# Patient Record
Sex: MALE | Race: BLACK OR AFRICAN AMERICAN | Employment: UNEMPLOYED | URBAN - METROPOLITAN AREA
[De-identification: names, ages, dates, MRNs, and addresses within clinical notes are randomized per-mention and may not be internally consistent; named-entity substitution may affect disease eponyms.]

---

## 2018-01-01 ENCOUNTER — HOSPITAL ENCOUNTER (INPATIENT)
Age: 68
LOS: 1 days | DRG: 853 | End: 2018-01-26
Attending: EMERGENCY MEDICINE | Admitting: INTERNAL MEDICINE
Payer: COMMERCIAL

## 2018-01-01 ENCOUNTER — APPOINTMENT (OUTPATIENT)
Dept: CT IMAGING | Age: 68
DRG: 853 | End: 2018-01-01
Payer: COMMERCIAL

## 2018-01-01 ENCOUNTER — APPOINTMENT (OUTPATIENT)
Dept: CARDIAC CATH/INVASIVE PROCEDURES | Age: 68
DRG: 853 | End: 2018-01-01
Payer: COMMERCIAL

## 2018-01-01 ENCOUNTER — APPOINTMENT (OUTPATIENT)
Dept: GENERAL RADIOLOGY | Age: 68
DRG: 853 | End: 2018-01-01
Payer: COMMERCIAL

## 2018-01-01 ENCOUNTER — APPOINTMENT (OUTPATIENT)
Dept: ULTRASOUND IMAGING | Age: 68
DRG: 853 | End: 2018-01-01
Payer: COMMERCIAL

## 2018-01-01 VITALS
HEART RATE: 61 BPM | DIASTOLIC BLOOD PRESSURE: 24 MMHG | RESPIRATION RATE: 12 BRPM | BODY MASS INDEX: 21.49 KG/M2 | WEIGHT: 150.13 LBS | TEMPERATURE: 98.2 F | SYSTOLIC BLOOD PRESSURE: 36 MMHG | HEIGHT: 70 IN | OXYGEN SATURATION: 42 %

## 2018-01-01 DIAGNOSIS — J96.02 ACUTE RESPIRATORY FAILURE WITH HYPOXIA AND HYPERCAPNIA (HCC): ICD-10-CM

## 2018-01-01 DIAGNOSIS — I50.9 ACUTE ON CHRONIC CONGESTIVE HEART FAILURE, UNSPECIFIED CONGESTIVE HEART FAILURE TYPE: ICD-10-CM

## 2018-01-01 DIAGNOSIS — J96.01 ACUTE RESPIRATORY FAILURE WITH HYPOXIA AND HYPERCAPNIA (HCC): ICD-10-CM

## 2018-01-01 DIAGNOSIS — I47.1 PAROXYSMAL SVT (SUPRAVENTRICULAR TACHYCARDIA) (HCC): ICD-10-CM

## 2018-01-01 DIAGNOSIS — A41.9 SEPSIS, DUE TO UNSPECIFIED ORGANISM: Primary | ICD-10-CM

## 2018-01-01 LAB
-: ABNORMAL
-: ABNORMAL
ABSOLUTE EOS #: 0 K/UL (ref 0–0.4)
ABSOLUTE EOS #: 0 K/UL (ref 0–0.44)
ABSOLUTE EOS #: 0 K/UL (ref 0–0.44)
ABSOLUTE IMMATURE GRANULOCYTE: 0 K/UL (ref 0–0.3)
ABSOLUTE LYMPH #: 44.72 K/UL (ref 1.1–3.7)
ABSOLUTE LYMPH #: 56.61 K/UL (ref 1.1–3.7)
ABSOLUTE LYMPH #: 73.92 K/UL (ref 1–4.8)
ABSOLUTE MONO #: 0.76 K/UL (ref 0.1–0.8)
ABSOLUTE MONO #: 6.24 K/UL (ref 0.1–1.2)
ABSOLUTE MONO #: 8.66 K/UL (ref 0.1–1.2)
ABSOLUTE RETIC #: 0.01 M/UL (ref 0.03–0.08)
ACETAMINOPHEN LEVEL: <10 UG/ML (ref 10–30)
ACTION: NORMAL
ALBUMIN SERPL-MCNC: 2.4 G/DL (ref 3.5–5.2)
ALBUMIN SERPL-MCNC: 2.6 G/DL (ref 3.5–5.2)
ALBUMIN/GLOBULIN RATIO: 1 (ref 1–2.5)
ALBUMIN/GLOBULIN RATIO: 1.4 (ref 1–2.5)
ALLEN TEST: ABNORMAL
ALLEN TEST: POSITIVE
ALP BLD-CCNC: 50 U/L (ref 40–129)
ALP BLD-CCNC: 54 U/L (ref 40–129)
ALT SERPL-CCNC: 11 U/L (ref 5–41)
ALT SERPL-CCNC: 9 U/L (ref 5–41)
AMORPHOUS: ABNORMAL
AMORPHOUS: ABNORMAL
AMPHETAMINE SCREEN URINE: NEGATIVE
ANION GAP SERPL CALCULATED.3IONS-SCNC: 16 MMOL/L (ref 9–17)
ANION GAP SERPL CALCULATED.3IONS-SCNC: 16 MMOL/L (ref 9–17)
ANION GAP SERPL CALCULATED.3IONS-SCNC: 20 MMOL/L (ref 9–17)
ANION GAP: 20 MMOL/L (ref 7–16)
AST SERPL-CCNC: 28 U/L
AST SERPL-CCNC: 56 U/L
BACTERIA: ABNORMAL
BACTERIA: ABNORMAL
BARBITURATE SCREEN URINE: NEGATIVE
BASOPHILS # BLD: 0 % (ref 0–2)
BASOPHILS ABSOLUTE: 0 K/UL (ref 0–0.2)
BENZODIAZEPINE SCREEN, URINE: NEGATIVE
BILIRUB SERPL-MCNC: 1.1 MG/DL (ref 0.3–1.2)
BILIRUB SERPL-MCNC: 1.3 MG/DL (ref 0.3–1.2)
BILIRUBIN DIRECT: 0.73 MG/DL
BILIRUBIN DIRECT: 0.83 MG/DL
BILIRUBIN URINE: NEGATIVE
BILIRUBIN URINE: NEGATIVE
BILIRUBIN, INDIRECT: 0.37 MG/DL (ref 0–1)
BILIRUBIN, INDIRECT: 0.47 MG/DL (ref 0–1)
BLOOD BANK SPECIMEN: NORMAL
BNP INTERPRETATION: ABNORMAL
BUN BLDV-MCNC: 41 MG/DL (ref 8–23)
BUN/CREAT BLD: ABNORMAL (ref 9–20)
BUPRENORPHINE URINE: NORMAL
CALCIUM SERPL-MCNC: 6.8 MG/DL (ref 8.6–10.4)
CALCIUM SERPL-MCNC: 7 MG/DL (ref 8.6–10.4)
CALCIUM SERPL-MCNC: 7.1 MG/DL (ref 8.6–10.4)
CANNABINOID SCREEN URINE: NEGATIVE
CASTS UA: ABNORMAL /LPF (ref 0–2)
CASTS UA: ABNORMAL /LPF (ref 0–2)
CHLORIDE BLD-SCNC: 108 MMOL/L (ref 98–107)
CHLORIDE BLD-SCNC: 109 MMOL/L (ref 98–107)
CHLORIDE BLD-SCNC: 110 MMOL/L (ref 98–107)
CO2: 11 MMOL/L (ref 20–31)
CO2: 19 MMOL/L (ref 20–31)
CO2: 20 MMOL/L (ref 20–31)
COCAINE METABOLITE, URINE: NEGATIVE
COLOR: YELLOW
COLOR: YELLOW
CREAT SERPL-MCNC: 1.24 MG/DL (ref 0.7–1.2)
CREAT SERPL-MCNC: 1.33 MG/DL (ref 0.7–1.2)
CREAT SERPL-MCNC: 1.51 MG/DL (ref 0.7–1.2)
CREATININE URINE: 89.9 MG/DL (ref 39–259)
CREATININE URINE: 97.9 MG/DL (ref 39–259)
CRYSTALS, UA: ABNORMAL /HPF
CRYSTALS, UA: ABNORMAL /HPF
DATE AND TIME: NORMAL
DIFFERENTIAL TYPE: ABNORMAL
DIRECT EXAM: NORMAL
EKG ATRIAL RATE: 122 BPM
EKG ATRIAL RATE: 123 BPM
EKG ATRIAL RATE: 144 BPM
EKG ATRIAL RATE: 149 BPM
EKG ATRIAL RATE: 156 BPM
EKG ATRIAL RATE: 41 BPM
EKG ATRIAL RATE: 60 BPM
EKG ATRIAL RATE: 77 BPM
EKG ATRIAL RATE: 86 BPM
EKG P AXIS: 50 DEGREES
EKG P AXIS: 73 DEGREES
EKG P AXIS: 73 DEGREES
EKG P AXIS: 83 DEGREES
EKG P-R INTERVAL: 156 MS
EKG P-R INTERVAL: 160 MS
EKG P-R INTERVAL: 160 MS
EKG P-R INTERVAL: 164 MS
EKG P-R INTERVAL: 168 MS
EKG Q-T INTERVAL: 240 MS
EKG Q-T INTERVAL: 266 MS
EKG Q-T INTERVAL: 270 MS
EKG Q-T INTERVAL: 296 MS
EKG Q-T INTERVAL: 314 MS
EKG Q-T INTERVAL: 316 MS
EKG Q-T INTERVAL: 322 MS
EKG Q-T INTERVAL: 330 MS
EKG Q-T INTERVAL: 390 MS
EKG QRS DURATION: 72 MS
EKG QRS DURATION: 78 MS
EKG QRS DURATION: 80 MS
EKG QRS DURATION: 82 MS
EKG QRS DURATION: 82 MS
EKG QRS DURATION: 84 MS
EKG QRS DURATION: 86 MS
EKG QTC CALCULATION (BAZETT): 321 MS
EKG QTC CALCULATION (BAZETT): 358 MS
EKG QTC CALCULATION (BAZETT): 398 MS
EKG QTC CALCULATION (BAZETT): 407 MS
EKG QTC CALCULATION (BAZETT): 418 MS
EKG QTC CALCULATION (BAZETT): 421 MS
EKG QTC CALCULATION (BAZETT): 449 MS
EKG QTC CALCULATION (BAZETT): 458 MS
EKG QTC CALCULATION (BAZETT): 460 MS
EKG R AXIS: 28 DEGREES
EKG R AXIS: 45 DEGREES
EKG R AXIS: 48 DEGREES
EKG R AXIS: 54 DEGREES
EKG R AXIS: 56 DEGREES
EKG R AXIS: 63 DEGREES
EKG R AXIS: 65 DEGREES
EKG R AXIS: 76 DEGREES
EKG R AXIS: 99 DEGREES
EKG T AXIS: -15 DEGREES
EKG T AXIS: -32 DEGREES
EKG T AXIS: -40 DEGREES
EKG T AXIS: -64 DEGREES
EKG T AXIS: -69 DEGREES
EKG T AXIS: 106 DEGREES
EKG T AXIS: 19 DEGREES
EKG T AXIS: 95 DEGREES
EKG T AXIS: 99 DEGREES
EKG VENTRICULAR RATE: 100 BPM
EKG VENTRICULAR RATE: 109 BPM
EKG VENTRICULAR RATE: 122 BPM
EKG VENTRICULAR RATE: 123 BPM
EKG VENTRICULAR RATE: 149 BPM
EKG VENTRICULAR RATE: 175 BPM
EKG VENTRICULAR RATE: 185 BPM
EKG VENTRICULAR RATE: 41 BPM
EKG VENTRICULAR RATE: 71 BPM
EOSINOPHILS RELATIVE PERCENT: 0 % (ref 1–4)
EPITHELIAL CELLS UA: ABNORMAL /HPF (ref 0–5)
EPITHELIAL CELLS UA: ABNORMAL /HPF (ref 0–5)
ETHANOL PERCENT: <0.01 %
ETHANOL: <10 MG/DL
FIO2: 100
FIO2: 60
FIO2: ABNORMAL
FIO2: ABNORMAL
GFR AFRICAN AMERICAN: 56 ML/MIN
GFR AFRICAN AMERICAN: >60 ML/MIN
GFR AFRICAN AMERICAN: >60 ML/MIN
GFR NON-AFRICAN AMERICAN: 30 ML/MIN
GFR NON-AFRICAN AMERICAN: 46 ML/MIN
GFR NON-AFRICAN AMERICAN: 53 ML/MIN
GFR NON-AFRICAN AMERICAN: 58 ML/MIN
GFR SERPL CREATININE-BSD FRML MDRD: 36 ML/MIN
GFR SERPL CREATININE-BSD FRML MDRD: ABNORMAL ML/MIN/{1.73_M2}
GLOBULIN: ABNORMAL G/DL (ref 1.5–3.8)
GLOBULIN: ABNORMAL G/DL (ref 1.5–3.8)
GLUCOSE BLD-MCNC: 103 MG/DL (ref 70–99)
GLUCOSE BLD-MCNC: 114 MG/DL (ref 70–99)
GLUCOSE BLD-MCNC: 115 MG/DL (ref 75–110)
GLUCOSE BLD-MCNC: 45 MG/DL (ref 74–100)
GLUCOSE BLD-MCNC: 52 MG/DL (ref 74–100)
GLUCOSE BLD-MCNC: 72 MG/DL (ref 74–100)
GLUCOSE BLD-MCNC: 86 MG/DL (ref 75–110)
GLUCOSE BLD-MCNC: 93 MG/DL (ref 70–99)
GLUCOSE BLD-MCNC: 93 MG/DL (ref 74–100)
GLUCOSE BLD-MCNC: 98 MG/DL (ref 75–110)
GLUCOSE URINE: NEGATIVE
GLUCOSE URINE: NEGATIVE
HAV IGM SER IA-ACNC: NONREACTIVE
HCO3 VENOUS: 17.5 MMOL/L (ref 22–29)
HCT VFR BLD CALC: 22.5 % (ref 40.7–50.3)
HCT VFR BLD CALC: 24.6 % (ref 40.7–50.3)
HCT VFR BLD CALC: 29.3 % (ref 40.7–50.3)
HEMOGLOBIN: 6.4 G/DL (ref 13–17)
HEMOGLOBIN: 7.1 G/DL (ref 13–17)
HEMOGLOBIN: 8.3 G/DL (ref 13–17)
HEPATITIS B CORE IGM ANTIBODY: NONREACTIVE
HEPATITIS B SURFACE ANTIGEN: NONREACTIVE
HEPATITIS C ANTIBODY: REACTIVE
HIV AG/AB: NONREACTIVE
IMMATURE GRANULOCYTES: 0 %
IMMATURE RETIC FRACT: 4.1 % (ref 2.7–18.3)
INR BLD: 1.3
KETONES, URINE: NEGATIVE
KETONES, URINE: NEGATIVE
LACTIC ACID, WHOLE BLOOD: 2.7 MMOL/L (ref 0.7–2.1)
LACTIC ACID, WHOLE BLOOD: 3.5 MMOL/L (ref 0.7–2.1)
LACTIC ACID, WHOLE BLOOD: 6.9 MMOL/L (ref 0.7–2.1)
LEUKOCYTE ESTERASE, URINE: NEGATIVE
LEUKOCYTE ESTERASE, URINE: NEGATIVE
LIPASE: 50 U/L (ref 13–60)
LV EF: 45 %
LVEF MODALITY: NORMAL
LYMPHOCYTES # BLD: 85 % (ref 24–43)
LYMPHOCYTES # BLD: 86 % (ref 24–43)
LYMPHOCYTES # BLD: 97 % (ref 24–44)
Lab: NORMAL
MAGNESIUM: 2.5 MG/DL (ref 1.6–2.6)
MCH RBC QN AUTO: 30.3 PG (ref 25.2–33.5)
MCH RBC QN AUTO: 30.9 PG (ref 25.2–33.5)
MCH RBC QN AUTO: 30.9 PG (ref 25.2–33.5)
MCHC RBC AUTO-ENTMCNC: 28.3 G/DL (ref 28.4–34.8)
MCHC RBC AUTO-ENTMCNC: 28.4 G/DL (ref 28.4–34.8)
MCHC RBC AUTO-ENTMCNC: 28.9 G/DL (ref 28.4–34.8)
MCV RBC AUTO: 106.6 FL (ref 82.6–102.9)
MCV RBC AUTO: 107 FL (ref 82.6–102.9)
MCV RBC AUTO: 108.9 FL (ref 82.6–102.9)
MDMA URINE: NORMAL
METHADONE SCREEN, URINE: NEGATIVE
METHAMPHETAMINE, URINE: NORMAL
MODE: ABNORMAL
MONOCYTES # BLD: 1 % (ref 1–7)
MONOCYTES # BLD: 12 % (ref 3–12)
MONOCYTES # BLD: 13 % (ref 3–12)
MORPHOLOGY: ABNORMAL
MRSA, DNA, NASAL: NORMAL
MUCUS: ABNORMAL
MUCUS: ABNORMAL
NEGATIVE BASE EXCESS, ART: 15 (ref 0–2)
NEGATIVE BASE EXCESS, ART: 16 (ref 0–2)
NEGATIVE BASE EXCESS, ART: 5 (ref 0–2)
NEGATIVE BASE EXCESS, ART: 5 (ref 0–2)
NEGATIVE BASE EXCESS, ART: 7 (ref 0–2)
NEGATIVE BASE EXCESS, VEN: 8 (ref 0–2)
NITRITE, URINE: NEGATIVE
NITRITE, URINE: NEGATIVE
NOTIFY: NORMAL
NRBC AUTOMATED: 0.1 PER 100 WBC
NRBC AUTOMATED: 0.1 PER 100 WBC
NRBC AUTOMATED: 0.5 PER 100 WBC
O2 DEVICE/FLOW/%: ABNORMAL
O2 SAT, VEN: 29 % (ref 60–85)
OPIATES, URINE: NEGATIVE
OTHER OBSERVATIONS UA: ABNORMAL
OTHER OBSERVATIONS UA: ABNORMAL
OXYCODONE SCREEN URINE: NEGATIVE
PATIENT TEMP: ABNORMAL
PCO2, VEN: 36.6 MM HG (ref 41–51)
PDW BLD-RTO: 19.6 % (ref 11.8–14.4)
PDW BLD-RTO: 19.9 % (ref 11.8–14.4)
PDW BLD-RTO: 19.9 % (ref 11.8–14.4)
PH UA: 5 (ref 5–8)
PH UA: 5 (ref 5–8)
PH VENOUS: 7.29 (ref 7.32–7.43)
PHENCYCLIDINE, URINE: NEGATIVE
PHOSPHORUS: 7.8 MG/DL (ref 2.5–4.5)
PLATELET # BLD: 67 K/UL (ref 138–453)
PLATELET # BLD: ABNORMAL K/UL (ref 138–453)
PLATELET # BLD: ABNORMAL K/UL (ref 138–453)
PLATELET ESTIMATE: ABNORMAL
PLATELET, FLUORESCENCE: 58 K/UL (ref 138–453)
PLATELET, FLUORESCENCE: 59 K/UL (ref 138–453)
PLATELET, IMMATURE FRACTION: 3.7 % (ref 1.1–10.3)
PLATELET, IMMATURE FRACTION: 6.6 % (ref 1.1–10.3)
PMV BLD AUTO: 11.7 FL (ref 8.1–13.5)
PMV BLD AUTO: ABNORMAL FL (ref 8.1–13.5)
PMV BLD AUTO: ABNORMAL FL (ref 8.1–13.5)
PO2, VEN: 20.9 MM HG (ref 30–50)
POC CHLORIDE: 110 MMOL/L (ref 98–107)
POC CREATININE: 2.2 MG/DL (ref 0.51–1.19)
POC HCO3: 14.8 MMOL/L (ref 21–28)
POC HCO3: 15.3 MMOL/L (ref 21–28)
POC HCO3: 19.6 MMOL/L (ref 21–28)
POC HCO3: 19.7 MMOL/L (ref 21–28)
POC HCO3: 21.7 MMOL/L (ref 21–28)
POC HEMATOCRIT: 27 % (ref 41–53)
POC HEMOGLOBIN: 9.2 G/DL (ref 13.5–17.5)
POC IONIZED CALCIUM: 1.1 MMOL/L (ref 1.15–1.33)
POC LACTIC ACID: 8.55 MMOL/L (ref 0.56–1.39)
POC O2 SATURATION: 60 % (ref 94–98)
POC O2 SATURATION: 78 % (ref 94–98)
POC O2 SATURATION: 84 % (ref 94–98)
POC O2 SATURATION: 86 % (ref 94–98)
POC O2 SATURATION: 96 % (ref 94–98)
POC PCO2 TEMP: ABNORMAL MM HG
POC PCO2: 33.8 MM HG (ref 35–48)
POC PCO2: 42.2 MM HG (ref 35–48)
POC PCO2: 47.5 MM HG (ref 35–48)
POC PCO2: 54.7 MM HG (ref 35–48)
POC PCO2: 58.5 MM HG (ref 35–48)
POC PH TEMP: ABNORMAL
POC PH: 7.01 (ref 7.35–7.45)
POC PH: 7.05 (ref 7.35–7.45)
POC PH: 7.27 (ref 7.35–7.45)
POC PH: 7.28 (ref 7.35–7.45)
POC PH: 7.37 (ref 7.35–7.45)
POC PO2 TEMP: ABNORMAL MM HG
POC PO2: 46.9 MM HG (ref 83–108)
POC PO2: 55 MM HG (ref 83–108)
POC PO2: 59.5 MM HG (ref 83–108)
POC PO2: 60.3 MM HG (ref 83–108)
POC PO2: 84.9 MM HG (ref 83–108)
POC POTASSIUM: 4.5 MMOL/L (ref 3.5–4.5)
POC SODIUM: 147 MMOL/L (ref 138–146)
POSITIVE BASE EXCESS, ART: ABNORMAL (ref 0–3)
POSITIVE BASE EXCESS, VEN: ABNORMAL (ref 0–3)
POTASSIUM SERPL-SCNC: 4.7 MMOL/L (ref 3.7–5.3)
POTASSIUM SERPL-SCNC: 4.8 MMOL/L (ref 3.7–5.3)
POTASSIUM SERPL-SCNC: 5.6 MMOL/L (ref 3.7–5.3)
PRO-BNP: 8855 PG/ML
PROPOXYPHENE, URINE: NORMAL
PROTEIN UA: ABNORMAL
PROTEIN UA: ABNORMAL
PROTHROMBIN TIME: 13.7 SEC (ref 9.4–12.6)
RBC # BLD: 2.11 M/UL (ref 4.21–5.77)
RBC # BLD: 2.3 M/UL (ref 4.21–5.77)
RBC # BLD: 2.69 M/UL (ref 4.21–5.77)
RBC # BLD: ABNORMAL 10*6/UL
RBC UA: ABNORMAL /HPF (ref 0–2)
RBC UA: ABNORMAL /HPF (ref 0–4)
READ BACK: YES
RENAL EPITHELIAL, UA: ABNORMAL /HPF
RENAL EPITHELIAL, UA: ABNORMAL /HPF
RETIC %: 0.5 % (ref 0.5–1.9)
RETIC HEMOGLOBIN: 33.4 PG (ref 28.2–35.7)
SALICYLATE LEVEL: <1 MG/DL (ref 3–10)
SAMPLE SITE: ABNORMAL
SEG NEUTROPHILS: 2 % (ref 36–65)
SEG NEUTROPHILS: 2 % (ref 36–65)
SEG NEUTROPHILS: 2 % (ref 36–66)
SEGMENTED NEUTROPHILS ABSOLUTE COUNT: 1.04 K/UL (ref 1.5–8.1)
SEGMENTED NEUTROPHILS ABSOLUTE COUNT: 1.33 K/UL (ref 1.5–8.1)
SEGMENTED NEUTROPHILS ABSOLUTE COUNT: 1.52 K/UL (ref 1.8–7.7)
SODIUM BLD-SCNC: 141 MMOL/L (ref 135–144)
SODIUM BLD-SCNC: 144 MMOL/L (ref 135–144)
SODIUM BLD-SCNC: 144 MMOL/L (ref 135–144)
SODIUM,UR: 28 MMOL/L
SPECIFIC GRAVITY UA: 1.01 (ref 1–1.03)
SPECIFIC GRAVITY UA: 1.02 (ref 1–1.03)
SPECIMEN DESCRIPTION: NORMAL
SPECIMEN DESCRIPTION: NORMAL
STATUS: NORMAL
SURGICAL PATHOLOGY REPORT: NORMAL
TCO2 (CALC), ART: 17 MMOL/L (ref 22–29)
TCO2 (CALC), ART: 17 MMOL/L (ref 22–29)
TCO2 (CALC), ART: 21 MMOL/L (ref 22–29)
TCO2 (CALC), ART: 21 MMOL/L (ref 22–29)
TCO2 (CALC), ART: 23 MMOL/L (ref 22–29)
TEST INFORMATION: NORMAL
TOTAL CO2, VENOUS: 19 MMOL/L (ref 23–30)
TOTAL PROTEIN, URINE: 164 MG/DL
TOTAL PROTEIN, URINE: 187 MG/DL
TOTAL PROTEIN: 4.4 G/DL (ref 6.4–8.3)
TOTAL PROTEIN: 4.7 G/DL (ref 6.4–8.3)
TOXIC TRICYCLIC SC,BLOOD: NEGATIVE
TRICHOMONAS: ABNORMAL
TRICHOMONAS: ABNORMAL
TRICYCLIC ANTIDEPRESSANTS, UR: NORMAL
TROPONIN INTERP: ABNORMAL
TROPONIN INTERP: NORMAL
TROPONIN INTERP: NORMAL
TROPONIN T: 0.05 NG/ML
TROPONIN T: <0.03 NG/ML
TROPONIN T: <0.03 NG/ML
TSH SERPL DL<=0.05 MIU/L-ACNC: 0.41 MIU/L (ref 0.3–5)
TURBIDITY: ABNORMAL
TURBIDITY: ABNORMAL
URINE HGB: ABNORMAL
URINE HGB: ABNORMAL
URINE TOTAL PROTEIN CREATININE RATIO: 2.08 (ref 0–0.2)
UROBILINOGEN, URINE: ABNORMAL
UROBILINOGEN, URINE: NORMAL
WBC # BLD: 52 K/UL (ref 3.5–11.3)
WBC # BLD: 66.6 K/UL (ref 3.5–11.3)
WBC # BLD: 76.2 K/UL (ref 3.5–11.3)
WBC # BLD: ABNORMAL 10*3/UL
WBC UA: ABNORMAL /HPF (ref 0–5)
WBC UA: ABNORMAL /HPF (ref 0–5)
YEAST: ABNORMAL
YEAST: ABNORMAL

## 2018-01-01 PROCEDURE — 93005 ELECTROCARDIOGRAM TRACING: CPT

## 2018-01-01 PROCEDURE — 94002 VENT MGMT INPAT INIT DAY: CPT

## 2018-01-01 PROCEDURE — 6360000002 HC RX W HCPCS: Performed by: HOSPITALIST

## 2018-01-01 PROCEDURE — 84156 ASSAY OF PROTEIN URINE: CPT

## 2018-01-01 PROCEDURE — 85055 RETICULATED PLATELET ASSAY: CPT

## 2018-01-01 PROCEDURE — 80048 BASIC METABOLIC PNL TOTAL CA: CPT

## 2018-01-01 PROCEDURE — 82803 BLOOD GASES ANY COMBINATION: CPT

## 2018-01-01 PROCEDURE — 82947 ASSAY GLUCOSE BLOOD QUANT: CPT

## 2018-01-01 PROCEDURE — 93458 L HRT ARTERY/VENTRICLE ANGIO: CPT | Performed by: INTERNAL MEDICINE

## 2018-01-01 PROCEDURE — 94640 AIRWAY INHALATION TREATMENT: CPT

## 2018-01-01 PROCEDURE — 82570 ASSAY OF URINE CREATININE: CPT

## 2018-01-01 PROCEDURE — 87040 BLOOD CULTURE FOR BACTERIA: CPT

## 2018-01-01 PROCEDURE — 96375 TX/PRO/DX INJ NEW DRUG ADDON: CPT

## 2018-01-01 PROCEDURE — 6360000002 HC RX W HCPCS: Performed by: STUDENT IN AN ORGANIZED HEALTH CARE EDUCATION/TRAINING PROGRAM

## 2018-01-01 PROCEDURE — 5A1935Z RESPIRATORY VENTILATION, LESS THAN 24 CONSECUTIVE HOURS: ICD-10-PCS | Performed by: INTERNAL MEDICINE

## 2018-01-01 PROCEDURE — B2151ZZ FLUOROSCOPY OF LEFT HEART USING LOW OSMOLAR CONTRAST: ICD-10-PCS | Performed by: INTERNAL MEDICINE

## 2018-01-01 PROCEDURE — 85025 COMPLETE CBC W/AUTO DIFF WBC: CPT

## 2018-01-01 PROCEDURE — 87077 CULTURE AEROBIC IDENTIFY: CPT

## 2018-01-01 PROCEDURE — C1769 GUIDE WIRE: HCPCS

## 2018-01-01 PROCEDURE — 2580000003 HC RX 258: Performed by: EMERGENCY MEDICINE

## 2018-01-01 PROCEDURE — 85045 AUTOMATED RETICULOCYTE COUNT: CPT

## 2018-01-01 PROCEDURE — 6370000000 HC RX 637 (ALT 250 FOR IP): Performed by: HOSPITALIST

## 2018-01-01 PROCEDURE — 85014 HEMATOCRIT: CPT

## 2018-01-01 PROCEDURE — 93306 TTE W/DOPPLER COMPLETE: CPT

## 2018-01-01 PROCEDURE — 83690 ASSAY OF LIPASE: CPT

## 2018-01-01 PROCEDURE — 81001 URINALYSIS AUTO W/SCOPE: CPT

## 2018-01-01 PROCEDURE — 99292 CRITICAL CARE ADDL 30 MIN: CPT | Performed by: INTERNAL MEDICINE

## 2018-01-01 PROCEDURE — 2500000003 HC RX 250 WO HCPCS: Performed by: EMERGENCY MEDICINE

## 2018-01-01 PROCEDURE — 6360000002 HC RX W HCPCS

## 2018-01-01 PROCEDURE — 99291 CRITICAL CARE FIRST HOUR: CPT | Performed by: INTERNAL MEDICINE

## 2018-01-01 PROCEDURE — 85610 PROTHROMBIN TIME: CPT

## 2018-01-01 PROCEDURE — 94660 CPAP INITIATION&MGMT: CPT

## 2018-01-01 PROCEDURE — 84132 ASSAY OF SERUM POTASSIUM: CPT

## 2018-01-01 PROCEDURE — 84295 ASSAY OF SERUM SODIUM: CPT

## 2018-01-01 PROCEDURE — 80076 HEPATIC FUNCTION PANEL: CPT

## 2018-01-01 PROCEDURE — 84100 ASSAY OF PHOSPHORUS: CPT

## 2018-01-01 PROCEDURE — 6360000002 HC RX W HCPCS: Performed by: EMERGENCY MEDICINE

## 2018-01-01 PROCEDURE — 2500000003 HC RX 250 WO HCPCS: Performed by: STUDENT IN AN ORGANIZED HEALTH CARE EDUCATION/TRAINING PROGRAM

## 2018-01-01 PROCEDURE — 84300 ASSAY OF URINE SODIUM: CPT

## 2018-01-01 PROCEDURE — 87186 SC STD MICRODIL/AGAR DIL: CPT

## 2018-01-01 PROCEDURE — 2500000003 HC RX 250 WO HCPCS

## 2018-01-01 PROCEDURE — 94003 VENT MGMT INPAT SUBQ DAY: CPT

## 2018-01-01 PROCEDURE — 86901 BLOOD TYPING SEROLOGIC RH(D): CPT

## 2018-01-01 PROCEDURE — C1894 INTRO/SHEATH, NON-LASER: HCPCS

## 2018-01-01 PROCEDURE — 84484 ASSAY OF TROPONIN QUANT: CPT

## 2018-01-01 PROCEDURE — P9016 RBC LEUKOCYTES REDUCED: HCPCS

## 2018-01-01 PROCEDURE — 80074 ACUTE HEPATITIS PANEL: CPT

## 2018-01-01 PROCEDURE — 83880 ASSAY OF NATRIURETIC PEPTIDE: CPT

## 2018-01-01 PROCEDURE — G0480 DRUG TEST DEF 1-7 CLASSES: HCPCS

## 2018-01-01 PROCEDURE — 83605 ASSAY OF LACTIC ACID: CPT

## 2018-01-01 PROCEDURE — 87205 SMEAR GRAM STAIN: CPT

## 2018-01-01 PROCEDURE — 36556 INSERT NON-TUNNEL CV CATH: CPT

## 2018-01-01 PROCEDURE — 71045 X-RAY EXAM CHEST 1 VIEW: CPT

## 2018-01-01 PROCEDURE — 83735 ASSAY OF MAGNESIUM: CPT

## 2018-01-01 PROCEDURE — 02HV33Z INSERTION OF INFUSION DEVICE INTO SUPERIOR VENA CAVA, PERCUTANEOUS APPROACH: ICD-10-PCS | Performed by: EMERGENCY MEDICINE

## 2018-01-01 PROCEDURE — 02703EZ DILATION OF CORONARY ARTERY, ONE ARTERY WITH TWO INTRALUMINAL DEVICES, PERCUTANEOUS APPROACH: ICD-10-PCS | Performed by: INTERNAL MEDICINE

## 2018-01-01 PROCEDURE — 51702 INSERT TEMP BLADDER CATH: CPT

## 2018-01-01 PROCEDURE — C1876 STENT, NON-COA/NON-COV W/DEL: HCPCS

## 2018-01-01 PROCEDURE — 92928 PRQ TCAT PLMT NTRAC ST 1 LES: CPT | Performed by: INTERNAL MEDICINE

## 2018-01-01 PROCEDURE — 92950 HEART/LUNG RESUSCITATION CPR: CPT

## 2018-01-01 PROCEDURE — 87641 MR-STAPH DNA AMP PROBE: CPT

## 2018-01-01 PROCEDURE — 87804 INFLUENZA ASSAY W/OPTIC: CPT

## 2018-01-01 PROCEDURE — 94762 N-INVAS EAR/PLS OXIMTRY CONT: CPT

## 2018-01-01 PROCEDURE — 2500000003 HC RX 250 WO HCPCS: Performed by: HOSPITALIST

## 2018-01-01 PROCEDURE — 87070 CULTURE OTHR SPECIMN AEROBIC: CPT

## 2018-01-01 PROCEDURE — 2580000003 HC RX 258: Performed by: HOSPITALIST

## 2018-01-01 PROCEDURE — 86850 RBC ANTIBODY SCREEN: CPT

## 2018-01-01 PROCEDURE — 96374 THER/PROPH/DIAG INJ IV PUSH: CPT

## 2018-01-01 PROCEDURE — B2111ZZ FLUOROSCOPY OF MULTIPLE CORONARY ARTERIES USING LOW OSMOLAR CONTRAST: ICD-10-PCS | Performed by: INTERNAL MEDICINE

## 2018-01-01 PROCEDURE — C1760 CLOSURE DEV, VASC: HCPCS

## 2018-01-01 PROCEDURE — 2580000003 HC RX 258

## 2018-01-01 PROCEDURE — 82435 ASSAY OF BLOOD CHLORIDE: CPT

## 2018-01-01 PROCEDURE — 36430 TRANSFUSION BLD/BLD COMPNT: CPT

## 2018-01-01 PROCEDURE — 31500 INSERT EMERGENCY AIRWAY: CPT

## 2018-01-01 PROCEDURE — 86900 BLOOD TYPING SEROLOGIC ABO: CPT

## 2018-01-01 PROCEDURE — 87389 HIV-1 AG W/HIV-1&-2 AB AG IA: CPT

## 2018-01-01 PROCEDURE — 87147 CULTURE TYPE IMMUNOLOGIC: CPT

## 2018-01-01 PROCEDURE — 6370000000 HC RX 637 (ALT 250 FOR IP): Performed by: INTERNAL MEDICINE

## 2018-01-01 PROCEDURE — 99222 1ST HOSP IP/OBS MODERATE 55: CPT | Performed by: INTERNAL MEDICINE

## 2018-01-01 PROCEDURE — 82565 ASSAY OF CREATININE: CPT

## 2018-01-01 PROCEDURE — 71250 CT THORAX DX C-: CPT

## 2018-01-01 PROCEDURE — C1887 CATHETER, GUIDING: HCPCS

## 2018-01-01 PROCEDURE — 36415 COLL VENOUS BLD VENIPUNCTURE: CPT

## 2018-01-01 PROCEDURE — 2580000003 HC RX 258: Performed by: INTERNAL MEDICINE

## 2018-01-01 PROCEDURE — 74176 CT ABD & PELVIS W/O CONTRAST: CPT

## 2018-01-01 PROCEDURE — 36620 INSERTION CATHETER ARTERY: CPT

## 2018-01-01 PROCEDURE — 86403 PARTICLE AGGLUT ANTBDY SCRN: CPT

## 2018-01-01 PROCEDURE — 94770 HC ETCO2 MONITOR DAILY: CPT

## 2018-01-01 PROCEDURE — 82330 ASSAY OF CALCIUM: CPT

## 2018-01-01 PROCEDURE — C1725 CATH, TRANSLUMIN NON-LASER: HCPCS

## 2018-01-01 PROCEDURE — 84443 ASSAY THYROID STIM HORMONE: CPT

## 2018-01-01 PROCEDURE — 36600 WITHDRAWAL OF ARTERIAL BLOOD: CPT

## 2018-01-01 PROCEDURE — 6370000000 HC RX 637 (ALT 250 FOR IP)

## 2018-01-01 PROCEDURE — 2580000003 HC RX 258: Performed by: STUDENT IN AN ORGANIZED HEALTH CARE EDUCATION/TRAINING PROGRAM

## 2018-01-01 PROCEDURE — 87149 DNA/RNA DIRECT PROBE: CPT

## 2018-01-01 PROCEDURE — 2000000000 HC ICU R&B

## 2018-01-01 PROCEDURE — 86920 COMPATIBILITY TEST SPIN: CPT

## 2018-01-01 PROCEDURE — 99285 EMERGENCY DEPT VISIT HI MDM: CPT

## 2018-01-01 PROCEDURE — 5A2204Z RESTORATION OF CARDIAC RHYTHM, SINGLE: ICD-10-PCS | Performed by: EMERGENCY MEDICINE

## 2018-01-01 PROCEDURE — 80307 DRUG TEST PRSMV CHEM ANLYZR: CPT

## 2018-01-01 PROCEDURE — 0BH18EZ INSERTION OF ENDOTRACHEAL AIRWAY INTO TRACHEA, VIA NATURAL OR ARTIFICIAL OPENING ENDOSCOPIC: ICD-10-PCS | Performed by: EMERGENCY MEDICINE

## 2018-01-01 PROCEDURE — 4A023N7 MEASUREMENT OF CARDIAC SAMPLING AND PRESSURE, LEFT HEART, PERCUTANEOUS APPROACH: ICD-10-PCS | Performed by: INTERNAL MEDICINE

## 2018-01-01 RX ORDER — HEPARIN SODIUM 1000 [USP'U]/ML
30 INJECTION, SOLUTION INTRAVENOUS; SUBCUTANEOUS PRN
Status: DISCONTINUED | OUTPATIENT
Start: 2018-01-01 | End: 2018-01-01 | Stop reason: HOSPADM

## 2018-01-01 RX ORDER — SODIUM CHLORIDE 9 MG/ML
INJECTION, SOLUTION INTRAVENOUS CONTINUOUS
Status: DISCONTINUED | OUTPATIENT
Start: 2018-01-01 | End: 2018-01-01 | Stop reason: HOSPADM

## 2018-01-01 RX ORDER — ASPIRIN 81 MG/1
324 TABLET, CHEWABLE ORAL ONCE
Status: DISCONTINUED | OUTPATIENT
Start: 2018-01-01 | End: 2018-01-01

## 2018-01-01 RX ORDER — DEXTROSE MONOHYDRATE 50 MG/ML
INJECTION, SOLUTION INTRAVENOUS
Status: DISPENSED
Start: 2018-01-01 | End: 2018-01-01

## 2018-01-01 RX ORDER — 0.9 % SODIUM CHLORIDE 0.9 %
1000 INTRAVENOUS SOLUTION INTRAVENOUS ONCE
Status: COMPLETED | OUTPATIENT
Start: 2018-01-01 | End: 2018-01-01

## 2018-01-01 RX ORDER — DEXTROSE MONOHYDRATE 25 G/50ML
12.5 INJECTION, SOLUTION INTRAVENOUS PRN
Status: DISCONTINUED | OUTPATIENT
Start: 2018-01-01 | End: 2018-01-01 | Stop reason: HOSPADM

## 2018-01-01 RX ORDER — AMIODARONE HYDROCHLORIDE 50 MG/ML
INJECTION, SOLUTION INTRAVENOUS
Status: COMPLETED
Start: 2018-01-01 | End: 2018-01-01

## 2018-01-01 RX ORDER — ACETAMINOPHEN 325 MG/1
650 TABLET ORAL EVERY 4 HOURS PRN
Status: DISCONTINUED | OUTPATIENT
Start: 2018-01-01 | End: 2018-01-01 | Stop reason: HOSPADM

## 2018-01-01 RX ORDER — SODIUM CHLORIDE 0.9 % (FLUSH) 0.9 %
10 SYRINGE (ML) INJECTION PRN
Status: DISCONTINUED | OUTPATIENT
Start: 2018-01-01 | End: 2018-01-01 | Stop reason: HOSPADM

## 2018-01-01 RX ORDER — ADENOSINE 3 MG/ML
INJECTION, SOLUTION INTRAVENOUS
Status: DISCONTINUED
Start: 2018-01-01 | End: 2018-01-01

## 2018-01-01 RX ORDER — ETOMIDATE 2 MG/ML
INJECTION INTRAVENOUS DAILY PRN
Status: COMPLETED | OUTPATIENT
Start: 2018-01-01 | End: 2018-01-01

## 2018-01-01 RX ORDER — HEPARIN SODIUM 1000 [USP'U]/ML
4000 INJECTION, SOLUTION INTRAVENOUS; SUBCUTANEOUS ONCE
Status: DISCONTINUED | OUTPATIENT
Start: 2018-01-01 | End: 2018-01-01 | Stop reason: HOSPADM

## 2018-01-01 RX ORDER — SODIUM CHLORIDE 0.9 % (FLUSH) 0.9 %
10 SYRINGE (ML) INJECTION EVERY 12 HOURS SCHEDULED
Status: DISCONTINUED | OUTPATIENT
Start: 2018-01-01 | End: 2018-01-01 | Stop reason: HOSPADM

## 2018-01-01 RX ORDER — MIDAZOLAM HYDROCHLORIDE 5 MG/ML
INJECTION INTRAMUSCULAR; INTRAVENOUS
Status: COMPLETED
Start: 2018-01-01 | End: 2018-01-01

## 2018-01-01 RX ORDER — LEVOFLOXACIN 5 MG/ML
250 INJECTION, SOLUTION INTRAVENOUS EVERY 24 HOURS
Status: DISCONTINUED | OUTPATIENT
Start: 2018-01-27 | End: 2018-01-01 | Stop reason: HOSPADM

## 2018-01-01 RX ORDER — HEPARIN SODIUM 1000 [USP'U]/ML
4000 INJECTION, SOLUTION INTRAVENOUS; SUBCUTANEOUS PRN
Status: DISCONTINUED | OUTPATIENT
Start: 2018-01-01 | End: 2018-01-01 | Stop reason: HOSPADM

## 2018-01-01 RX ORDER — DEXTROSE MONOHYDRATE 25 G/50ML
INJECTION, SOLUTION INTRAVENOUS
Status: COMPLETED
Start: 2018-01-01 | End: 2018-01-01

## 2018-01-01 RX ORDER — SUCCINYLCHOLINE CHLORIDE 20 MG/ML
INJECTION INTRAMUSCULAR; INTRAVENOUS DAILY PRN
Status: COMPLETED | OUTPATIENT
Start: 2018-01-01 | End: 2018-01-01

## 2018-01-01 RX ORDER — 0.9 % SODIUM CHLORIDE 0.9 %
500 INTRAVENOUS SOLUTION INTRAVENOUS ONCE
Status: COMPLETED | OUTPATIENT
Start: 2018-01-01 | End: 2018-01-01

## 2018-01-01 RX ORDER — SODIUM CHLORIDE 9 MG/ML
INJECTION, SOLUTION INTRAVENOUS CONTINUOUS
Status: DISCONTINUED | OUTPATIENT
Start: 2018-01-01 | End: 2018-01-01

## 2018-01-01 RX ORDER — HEPARIN SODIUM 1000 [USP'U]/ML
30 INJECTION, SOLUTION INTRAVENOUS; SUBCUTANEOUS PRN
Status: DISCONTINUED | OUTPATIENT
Start: 2018-01-01 | End: 2018-01-01

## 2018-01-01 RX ORDER — HEPARIN SODIUM 10000 [USP'U]/100ML
12 INJECTION, SOLUTION INTRAVENOUS CONTINUOUS
Status: DISCONTINUED | OUTPATIENT
Start: 2018-01-01 | End: 2018-01-01

## 2018-01-01 RX ORDER — ADENOSINE 3 MG/ML
INJECTION, SOLUTION INTRAVENOUS DAILY PRN
Status: COMPLETED | OUTPATIENT
Start: 2018-01-01 | End: 2018-01-01

## 2018-01-01 RX ORDER — ATORVASTATIN CALCIUM 80 MG/1
80 TABLET, FILM COATED ORAL NIGHTLY
Status: DISCONTINUED | OUTPATIENT
Start: 2018-01-01 | End: 2018-01-01 | Stop reason: HOSPADM

## 2018-01-01 RX ORDER — HEPARIN SODIUM 10000 [USP'U]/100ML
12 INJECTION, SOLUTION INTRAVENOUS CONTINUOUS
Status: DISCONTINUED | OUTPATIENT
Start: 2018-01-01 | End: 2018-01-01 | Stop reason: HOSPADM

## 2018-01-01 RX ORDER — AMIODARONE HYDROCHLORIDE 50 MG/ML
INJECTION, SOLUTION INTRAVENOUS
Status: DISPENSED
Start: 2018-01-01 | End: 2018-01-01

## 2018-01-01 RX ORDER — DIGOXIN 0.25 MG/ML
250 INJECTION INTRAMUSCULAR; INTRAVENOUS EVERY 4 HOURS
Status: DISCONTINUED | OUTPATIENT
Start: 2018-01-01 | End: 2018-01-01

## 2018-01-01 RX ORDER — DEXTROSE MONOHYDRATE 25 G/50ML
25 INJECTION, SOLUTION INTRAVENOUS PRN
Status: DISCONTINUED | OUTPATIENT
Start: 2018-01-01 | End: 2018-01-01 | Stop reason: HOSPADM

## 2018-01-01 RX ORDER — ASPIRIN 81 MG/1
81 TABLET, CHEWABLE ORAL DAILY
Status: DISCONTINUED | OUTPATIENT
Start: 2018-01-27 | End: 2018-01-01 | Stop reason: HOSPADM

## 2018-01-01 RX ORDER — DEXTROSE MONOHYDRATE 50 MG/ML
100 INJECTION, SOLUTION INTRAVENOUS PRN
Status: DISCONTINUED | OUTPATIENT
Start: 2018-01-01 | End: 2018-01-01 | Stop reason: HOSPADM

## 2018-01-01 RX ORDER — HEPARIN SODIUM 1000 [USP'U]/ML
4000 INJECTION, SOLUTION INTRAVENOUS; SUBCUTANEOUS PRN
Status: DISCONTINUED | OUTPATIENT
Start: 2018-01-01 | End: 2018-01-01

## 2018-01-01 RX ORDER — IPRATROPIUM BROMIDE AND ALBUTEROL SULFATE 2.5; .5 MG/3ML; MG/3ML
1 SOLUTION RESPIRATORY (INHALATION) 4 TIMES DAILY
Status: DISCONTINUED | OUTPATIENT
Start: 2018-01-01 | End: 2018-01-01 | Stop reason: HOSPADM

## 2018-01-01 RX ORDER — DOPAMINE HYDROCHLORIDE 160 MG/100ML
2.5 INJECTION, SOLUTION INTRAVENOUS CONTINUOUS
Status: DISCONTINUED | OUTPATIENT
Start: 2018-01-01 | End: 2018-01-01

## 2018-01-01 RX ORDER — DEXTROSE MONOHYDRATE 25 G/50ML
25 INJECTION, SOLUTION INTRAVENOUS ONCE
Status: DISCONTINUED | OUTPATIENT
Start: 2018-01-01 | End: 2018-01-01

## 2018-01-01 RX ORDER — NICOTINE POLACRILEX 4 MG
15 LOZENGE BUCCAL PRN
Status: DISCONTINUED | OUTPATIENT
Start: 2018-01-01 | End: 2018-01-01 | Stop reason: HOSPADM

## 2018-01-01 RX ORDER — DEXTROSE MONOHYDRATE 25 G/50ML
25 INJECTION, SOLUTION INTRAVENOUS ONCE
Status: COMPLETED | OUTPATIENT
Start: 2018-01-01 | End: 2018-01-01

## 2018-01-01 RX ORDER — 0.9 % SODIUM CHLORIDE 0.9 %
250 INTRAVENOUS SOLUTION INTRAVENOUS ONCE
Status: COMPLETED | OUTPATIENT
Start: 2018-01-01 | End: 2018-01-01

## 2018-01-01 RX ORDER — ONDANSETRON 2 MG/ML
4 INJECTION INTRAMUSCULAR; INTRAVENOUS EVERY 6 HOURS PRN
Status: DISCONTINUED | OUTPATIENT
Start: 2018-01-01 | End: 2018-01-01 | Stop reason: HOSPADM

## 2018-01-01 RX ORDER — LEVOFLOXACIN 5 MG/ML
500 INJECTION, SOLUTION INTRAVENOUS ONCE
Status: DISCONTINUED | OUTPATIENT
Start: 2018-01-01 | End: 2018-01-01 | Stop reason: HOSPADM

## 2018-01-01 RX ORDER — VECURONIUM BROMIDE 1 MG/ML
10 INJECTION, POWDER, LYOPHILIZED, FOR SOLUTION INTRAVENOUS ONCE
Status: COMPLETED | OUTPATIENT
Start: 2018-01-01 | End: 2018-01-01

## 2018-01-01 RX ORDER — CLOPIDOGREL BISULFATE 75 MG/1
75 TABLET ORAL DAILY
Status: DISCONTINUED | OUTPATIENT
Start: 2018-01-27 | End: 2018-01-01 | Stop reason: HOSPADM

## 2018-01-01 RX ORDER — ALBUTEROL SULFATE 2.5 MG/3ML
2.5 SOLUTION RESPIRATORY (INHALATION) 2 TIMES DAILY
Status: DISCONTINUED | OUTPATIENT
Start: 2018-01-27 | End: 2018-01-01 | Stop reason: HOSPADM

## 2018-01-01 RX ORDER — LEVOFLOXACIN 5 MG/ML
250 INJECTION, SOLUTION INTRAVENOUS EVERY 24 HOURS
Status: DISCONTINUED | OUTPATIENT
Start: 2018-01-27 | End: 2018-01-01

## 2018-01-01 RX ORDER — DOPAMINE HYDROCHLORIDE 160 MG/100ML
20 INJECTION, SOLUTION INTRAVENOUS CONTINUOUS
Status: DISCONTINUED | OUTPATIENT
Start: 2018-01-01 | End: 2018-01-01 | Stop reason: HOSPADM

## 2018-01-01 RX ADMIN — AMIODARONE HYDROCHLORIDE 1 MG/MIN: 50 INJECTION, SOLUTION INTRAVENOUS at 08:40

## 2018-01-01 RX ADMIN — IPRATROPIUM BROMIDE AND ALBUTEROL SULFATE 1 AMPULE: .5; 3 SOLUTION RESPIRATORY (INHALATION) at 11:33

## 2018-01-01 RX ADMIN — DEXTROSE MONOHYDRATE 25 G: 500 INJECTION PARENTERAL at 15:06

## 2018-01-01 RX ADMIN — AMIODARONE HYDROCHLORIDE 150 MG: 50 INJECTION, SOLUTION INTRAVENOUS at 00:03

## 2018-01-01 RX ADMIN — AMIODARONE HYDROCHLORIDE 1 MG/MIN: 50 INJECTION, SOLUTION INTRAVENOUS at 01:05

## 2018-01-01 RX ADMIN — EPINEPHRINE 10 MCG/MIN: 1 INJECTION PARENTERAL at 13:37

## 2018-01-01 RX ADMIN — DIGOXIN 250 MCG: 0.25 INJECTION INTRAMUSCULAR; INTRAVENOUS at 06:36

## 2018-01-01 RX ADMIN — HYDROCORTISONE SODIUM SUCCINATE 100 MG: 100 INJECTION, POWDER, FOR SOLUTION INTRAMUSCULAR; INTRAVENOUS at 15:49

## 2018-01-01 RX ADMIN — SODIUM BICARBONATE 50 MEQ: 84 INJECTION INTRAVENOUS at 13:49

## 2018-01-01 RX ADMIN — SODIUM CHLORIDE 500 ML: 9 INJECTION, SOLUTION INTRAVENOUS at 11:00

## 2018-01-01 RX ADMIN — SODIUM CHLORIDE 1000 ML: 9 INJECTION, SOLUTION INTRAVENOUS at 05:00

## 2018-01-01 RX ADMIN — Medication 30 MCG/MIN: at 15:08

## 2018-01-01 RX ADMIN — TAZOBACTAM SODIUM AND PIPERACILLIN SODIUM 3.38 G: 375; 3 INJECTION, SOLUTION INTRAVENOUS at 05:00

## 2018-01-01 RX ADMIN — SODIUM BICARBONATE 50 MEQ: 84 INJECTION, SOLUTION INTRAVENOUS at 14:47

## 2018-01-01 RX ADMIN — VASOPRESSIN 0.04 UNITS/MIN: 20 INJECTION INTRAVENOUS at 13:26

## 2018-01-01 RX ADMIN — DEXTROSE MONOHYDRATE 12.5 G: 25 INJECTION, SOLUTION INTRAVENOUS at 13:45

## 2018-01-01 RX ADMIN — DOPAMINE HYDROCHLORIDE 20 MCG/KG/MIN: 160 INJECTION, SOLUTION INTRAVENOUS at 13:31

## 2018-01-01 RX ADMIN — DEXTROSE MONOHYDRATE 12.5 G: 25 INJECTION, SOLUTION INTRAVENOUS at 11:19

## 2018-01-01 RX ADMIN — SODIUM CHLORIDE 1000 ML: 9 INJECTION, SOLUTION INTRAVENOUS at 15:24

## 2018-01-01 RX ADMIN — INSULIN HUMAN 10 UNITS: 100 INJECTION, SOLUTION PARENTERAL at 15:02

## 2018-01-01 RX ADMIN — AMIODARONE HYDROCHLORIDE 1 MG/MIN: 50 INJECTION, SOLUTION INTRAVENOUS at 16:17

## 2018-01-01 RX ADMIN — DEXTROSE MONOHYDRATE 25 G: 25 INJECTION, SOLUTION INTRAVENOUS at 16:37

## 2018-01-01 RX ADMIN — ADENOSINE 6 MG: 3 INJECTION, SOLUTION INTRAVENOUS at 23:40

## 2018-01-01 RX ADMIN — SODIUM CHLORIDE 250 ML: 9 INJECTION, SOLUTION INTRAVENOUS at 06:37

## 2018-01-01 RX ADMIN — SODIUM BICARBONATE 50 MEQ: 84 INJECTION, SOLUTION INTRAVENOUS at 16:39

## 2018-01-01 RX ADMIN — SODIUM BICARBONATE 50 MEQ: 84 INJECTION, SOLUTION INTRAVENOUS at 16:52

## 2018-01-01 RX ADMIN — MIDAZOLAM 4 MG: 5 INJECTION INTRAMUSCULAR; INTRAVENOUS at 23:45

## 2018-01-01 RX ADMIN — Medication 2 MG/HR: at 06:00

## 2018-01-01 RX ADMIN — ETOMIDATE 20 MG: 20 INJECTION, SOLUTION INTRAVENOUS at 23:56

## 2018-01-01 RX ADMIN — Medication 1250 MG: at 02:00

## 2018-01-01 RX ADMIN — SODIUM CHLORIDE: 9 INJECTION, SOLUTION INTRAVENOUS at 05:36

## 2018-01-01 RX ADMIN — SODIUM BICARBONATE: 84 INJECTION, SOLUTION INTRAVENOUS at 12:45

## 2018-01-01 RX ADMIN — IPRATROPIUM BROMIDE AND ALBUTEROL SULFATE 1 AMPULE: .5; 3 SOLUTION RESPIRATORY (INHALATION) at 15:32

## 2018-01-01 RX ADMIN — Medication 100 MG: at 23:57

## 2018-01-01 RX ADMIN — SODIUM BICARBONATE 50 MEQ: 84 INJECTION, SOLUTION INTRAVENOUS at 16:41

## 2018-01-01 RX ADMIN — ADENOSINE 12 MG: 3 INJECTION, SOLUTION INTRAVENOUS at 23:44

## 2018-01-01 RX ADMIN — DOPAMINE HYDROCHLORIDE 10 MCG/KG/MIN: 160 INJECTION, SOLUTION INTRAVENOUS at 08:00

## 2018-01-01 RX ADMIN — CALCIUM GLUCONATE 2 G: 98 INJECTION, SOLUTION INTRAVENOUS at 10:45

## 2018-01-01 RX ADMIN — Medication 50 MEQ: at 16:39

## 2018-01-01 RX ADMIN — VECURONIUM BROMIDE 10 MG: 1 INJECTION, POWDER, LYOPHILIZED, FOR SOLUTION INTRAVENOUS at 02:23

## 2018-01-01 RX ADMIN — TAZOBACTAM SODIUM AND PIPERACILLIN SODIUM 3.38 G: 375; 3 INJECTION, SOLUTION INTRAVENOUS at 13:30

## 2018-01-01 RX ADMIN — SODIUM BICARBONATE 100 MEQ: 84 INJECTION, SOLUTION INTRAVENOUS at 12:39

## 2018-01-01 ASSESSMENT — PULMONARY FUNCTION TESTS
PIF_VALUE: 34
PIF_VALUE: 33
PIF_VALUE: 37
PIF_VALUE: 33
PIF_VALUE: 36
PIF_VALUE: 25

## 2018-01-26 PROBLEM — J96.01 ACUTE RESPIRATORY FAILURE WITH HYPOXIA AND HYPERCAPNIA (HCC): Status: ACTIVE | Noted: 2018-01-01

## 2018-01-26 PROBLEM — J96.02 ACUTE RESPIRATORY FAILURE WITH HYPOXIA AND HYPERCAPNIA (HCC): Status: ACTIVE | Noted: 2018-01-01

## 2018-01-26 PROBLEM — J96.90 RESPIRATORY FAILURE (HCC): Status: ACTIVE | Noted: 2018-01-01

## 2018-01-26 NOTE — PROGRESS NOTES
[address] called to floor. Update given on patient condition. Notified him that prognosis is grave and that Mr. Tamy Levine may not survive by the time he comes to the hospital. He reported that we should continue to do what we can for him and that he is trying to get to the hospital.    His phone number is [phone]    Angie Rodriguez M.D.  PGY-2 Internal Medicine  [address] [facility] [place]

## 2018-01-26 NOTE — PROGRESS NOTES
Ventilator Bronchodilator assessment    Breath sounds: diminished  Inspiratory Pressure: 36  Plateau Pressure: 33    Patient assessed at level 4          []    Bronchodilator Assessment    BRONCHODILATOR ASSESSMENT SCORE  Score 0 (Home) 1 2 3 4   Breath Sounds   []  Chronic Ventilator: Patient at baseline []  Mild Wheezes/ Clear []  Intermittent wheezes with good air entry [x]  Bilateral/unilateral wheezing with diminished air entry []  Insp/Exp wheeze and/or poor aeration   Ventilator Pressures   []  Chronic Ventilator []  Insp. Pressure less than 25 cm H20 []  Insp. Pressure less than 25 cm H20 []  Insp. Pressure exceeds 25 cm H20 [x]  Insp.  Pressure exceeds 30 cm H20   Plateau Pressure []  NA   [x]  Plateau Pressure less than 4  []  Plateau Pressure less than or equal to 5 []  Plateau Pressure greater than or equal to 6 []  Plateau Pressure greater than or equal to 8       SEDRICK ALBRECHT  10:27 AM

## 2018-01-26 NOTE — PROGRESS NOTES
Was called once more due to patient becoming bradycardic and then pulseless. He has already been given max dose of atropine. Patient given epinephrine again, initial rhythm asystole. Multiple rounds of CPR done. Patient did obtain ROSC at one point, patient was very bradycardic. We attempted to pace the patient, but we were unable to get capture. Bedside US showed very poor contractility, but no effusion. Patient lost pulses again and CPR was initiated. Code was run for about 24 minutes before the decision was made to pronounce the patient as care at this time was futile. Bedside US did not show any cardiac activity and rhythm on the monitor was asystole. Trauma, Emergency and Critical Surgical Services      DEATH NOTE    PATIENT NAME: Maryann Butt  BIRTHDATE: [birth year]  MEDICAL RECORD NO. [id number]  DATE: 1/26/2018  PRIMARY CARE PHYSICIAN: No primary care provider on file. DIAGNOSIS OF DEATH     I have confirmed the death of this patient in accordance with accepted medical standards.   The patient is dead as evidenced by cardiac death or cessation of brain function:    Cardiac Death (check all that apply):     [x]  Absence of respiratory effort by observation     [x]  Absence of pulse by palpation     [x]  Absence of blood pressure by sphygmomanometry     [x]  Absence of sustainable cardiac rhythm by monitor      CERTIFICATION OF DEATH     I have pronounced the patient dead on:     Date: 1/26/2018 at 1    NOTIFICATIONS     Attending physician (name) notified: Dr. Anthony Aldridge                                                        []  Per Nursing    Family notified: Name and/or Relationship: awaiting arrival of family                                                       []  Per Nursing     notified (Name):                                                      [x]  Per [facility],   1/26/2018, 5:34 PM

## 2018-01-26 NOTE — ED PROVIDER NOTES
hospitalization), asthma (past intubation, hospitalization, tobacco history), Pneumothorax, anaphylaxis, anxiety, PE (FH, OCP, tobacco use, BMI, immobilization, recent surgery, cancer, past DVT/ PE), pericardial effusion, CHF, ACS/MI, atelectasis, lower airway obstruction, aspiration, sudden onset, fever, cough, leg swelling.     Diagnostic Results     LABS:  Results for orders placed or performed during the hospital encounter of 01/25/18   Hemoglobin and hematocrit, blood   Result Value Ref Range    POC Hemoglobin 9.2 (L) 13.5 - 17.5 g/dL    POC Hematocrit 27 (L) 41 - 53 %   SODIUM (POC)   Result Value Ref Range    POC Sodium 147 (H) 138 - 146 mmol/L   POTASSIUM (POC)   Result Value Ref Range    POC Potassium 4.5 3.5 - 4.5 mmol/L   CHLORIDE (POC)   Result Value Ref Range    POC Chloride 110 (H) 98 - 107 mmol/L   CALCIUM, IONIC (POC)   Result Value Ref Range    POC Ionized Calcium 1.10 (L) 1.15 - 1.33 mmol/L   Venous Blood Gas, POC   Result Value Ref Range    pH, Joo 7.287 (L) 7.320 - 7.430    pCO2, Joo 36.6 (L) 41.0 - 51.0 mm Hg    pO2, Joo 20.9 (L) 30.0 - 50.0 mm Hg    HCO3, Venous 17.5 (L) 22.0 - 29.0 mmol/L    Total CO2, Venous 19 (L) 23.0 - 30.0 mmol/L    Negative Base Excess, Joo 8 (H) 0.0 - 2.0    Positive Base Excess, Joo NOT REPORTED 0.0 - 3.0    O2 Sat, Joo 29 (L) 60.0 - 85.0 %    O2 Device/Flow/% NOT REPORTED     Shad Test NOT REPORTED     Sample Site NOT REPORTED     Mode NOT REPORTED     FIO2 NOT REPORTED     Pt Temp NOT REPORTED     POC pH Temp NOT REPORTED     POC pCO2 Temp NOT REPORTED mm Hg    POC pO2 Temp NOT REPORTED mm Hg   Creatinine W/GFR Point of Care   Result Value Ref Range    POC Creatinine 2.20 (H) 0.51 - 1.19 mg/dL    GFR Comment 36 (L) >60 mL/min    GFR Non-African American 30 (L) >60 mL/min    GFR Comment         Lactic Acid, POC   Result Value Ref Range    POC Lactic Acid 8.55 (H) 0.56 - 1.39 mmol/L   POCT Glucose   Result Value Ref Range    POC Glucose 72 (L) 74 - 100 mg/dL   Anion supraventricular tachycardia  Rate: >160  Axis: normal  Ectopy: none  Conduction: normal  ST Segments: no acute change  T Waves: no acute change  Q Waves: none    Clinical Impression: supraventricular tachycardia    EKG #3  Rhythm: sinus tachycardia  Rate: 120-130  Axis: normal  Ectopy: Occasional PVCs  Conduction: normal  ST Segments: no acute change  T Waves: no acute change  Q Waves: none    Clinical Impression: non-specific EKG and sinus tachycardia      All EKG's are interpreted by the Emergency Department Physician who either signs or Co-signs this chart in the absence of a cardiologist.    Medical decision making  (MDM) / ED Course     On arrival, the patient was put on the ED CPAP. The patient was at that time noted to be tachycardic. A 12-lead EKG was obtained and it was unclear as to whether the patient was in atrial fibrillation, atrial flutter, or atrial tach. I believe it unlikely the patient was in sinus tachycardia as his rate was considerably higher than a typical sinus tach. The patient was seen and at the stable and cognizant of all events. The decision was made to attempt adenosine to break this rhythm. 6 mg of adenosine was given rapid IV push the typical sinus arrest was noted, the rhythm slowed down and abruptly went back up. The patient was then given 12 mg of adenosine rapid IV push and again, the typical sinus arrest was noted, the rate slowed down and once again, went back up. As the patient was continuing to have respiratory distress and a very rapid heart rate of now closer to 200 to 220s the decision was made to cardiovert the patient. The patient was given 4 mg of Versed in preparation for synchronized cardioversion. The patient wasn't cardioverted at 100 J. The patient's rate slowed down but once again, went back up. At this time the patient became less responsive and was not able to purposefully respond.   There were increasing concerns the patient may be septic and require high volumes of fluid resuscitation, at minimum 30 mL's per kilogram, and given the patient's already exacerbated state and fluid overload the decision was made to intubate him. Using a kaleidoscope I endotracheally intubated him with an 8.0 ET tube without difficulty using etomidate and succinylcholine. Following intubation, IV fluids were opened up to ensure the patient got his 30 mL per kilo bolus as required by sepsis protocol the decision was made to get again cardiovert the patient. This time the patient was cardioverted at 150 J. This cardioversion was effective at reducing the rate and the patient's rate stayed in the 120s to 140s and was sinus in origin. The patient was then given an amiodarone bolus of 150 mg and started on an amiodarone drip. Further thereafter, the patient lost IV access and a right internal jugular line was placed without difficulty and placement was confirmed by x-ray. Sepsis bundle was ordered and continued including Zosyn and vancomycin. The patient had significant renal failure as evidenced by elevated creatinines and make a mycin was dosed by pharmacy. Throughout the duration of this resuscitation, the patient remained somewhat hypotensive despite fluid resuscitation and a lever fed drip was initiated. The patient responded well to lever fed drip and his systolic blood pressure was maintained at roughly 100 mmHg. Arterial line was placed by Jeanine Hutson RRT. I discussed this case with Dr. Jinny Marx who accepted the patient on behalf of the critical care team.  Patient was then subsequently transferred to the ICU without further event. IMPRESSION:   Sepsis in the setting of acute heart failure exacerbation    ED Course as of Jan 26 0202 Fri Jan 26, 2018   0145 Spoke to Dr. Lucinda Perez from Nephrology and received no questions or orders. Nephrology will see patient tomorrow.   [WK]      ED Course User Index  [WK] Verna Fabry, MD     PROCEDURES:    Intubation Procedure Note    Performed by: Jostin Corado MD    Indication: impending respiratory failure    Consent: Unable to be obtained due to the emergent nature of this procedure. Time out performed: Immediately prior to the procedure a \"time out\" was called to verify the correct patient, the correct procedure, equipment, support staff and site/side marked as required. Medications Used: etomidate intravenously and succinycholine intravenously    Procedure: The patient was placed in the appropriate position. Intubation was performed by direct laryngoscopy using a laryngoscope and an 8.0 cuffed endotracheal tube. The cuff was then inflated and the tube was secured appropriately at a distance of 25 cm to the dental ridge. Initial confirmation of placement included bilateral breath sounds, an end tidal CO2 detector, absence of sounds over the stomach, tube fogging, adequate chest rise and adequate pulse oximetry reading. A chest x-ray to verify correct placement of the tube showed appropriate tube position. The patient tolerated the procedure well. Complications: None    Cardioversion Procedure Note    Performed by: Jostin Corado MD    Indication: supraventricular tachycardia    Consent: Unable to be obtained due to the emergent nature of this procedure. Pre-Medication: midazolam (Versed) 4.0 mg intravenously    Procedure: The patient was placed in the supine position and the chest area was exposed. The cardioversion pads were applied in the standard manner and configuration. Attempt #1: The defibrillator was set on the synchronous mode and charged to 100 joules. A charge was then delivered which resulted in no change in rhythm. Attempt #2: The defibrillator was set on the synchronous and charged to 150 joules. A charge was then delivered which resulted in  conversion to sinus tachycardia. The patient tolerated the procedure well.     Complications: None    Central Line Placement Procedure

## 2018-01-26 NOTE — PROGRESS NOTES
Arrived in pt room with spo2 reading low 70's on Fio2 0.70 and Peep 10. FiO2 increased to 1.0 and peep to 14 due to abg result of pao2 of 55mmhg, will obtain another abg in 1 hour.

## 2018-01-26 NOTE — PROGRESS NOTES
Pt continues to deteriorate, Maxed out on Levophed, Dopamine, Epinephrine and Vasopressin drips. Dr. Vale Hammond called family and updated on patient status. Dr. Vale Hammond and Dr. June Houston at bedside throughout all codes (see code blue flowsheets in chart). Time of death called by Dr. June Houston at 9032. Dr. Vale Hammond called grandson and informed of time of death. Grandson en route to the hospital now.

## 2018-01-26 NOTE — PROGRESS NOTES
Was called again to bedside due to decreased heart rate. ABG drawn after previous code with ph 6.7, CO2 73, pO2 43, and HCO3 11.2. Atropine 0.5mg given. Patient continued to decompensate. 1amp bicarb also given. ACLS initiated with 2 rounds of CPR with ROSC. Heart rate of 81 and BP of 92/56. Patient continues to remain unresponsive with very poor prognosis. Family has been called to try and notify, but no response. No cough, corneal, gag, or pupillary response. Ari Villar,   Critical Care Resident    Attending Physician Statement  I have discussed the care of Temi Mathews, including pertinent history and exam findings with the resident. I have reviewed the key elements of all parts of the encounter with the resident. I have seen and examined the patient with the resident. I agree with the assessment and plan and status of the problem list as documented. As mentioned before and during this progress note I was present and he has code blue multiple times usually after code blue he does get his circulation back but quickly coded again he severely acidotic despite multiple bicarbonate stat and pulse and bicarbonate drip remains on 5 pressors and on ventilator with maximal support, and grandson was contacted multiple times and updated and advised that he has poor prognosis and that he would not survive with multiple resuscitation and code blue he told her that he is looking for a ride to come to the hospital.    Total critical care time caring for this patient with life threatening, unstable organ failure, including direct patient contact, management of life support systems, review of data including imaging and labs, discussions with other team members and physicians at least 27  Min so far today, excluding procedures.         Patrick Owusu MD  1/26/2018 5:24 PM

## 2018-01-26 NOTE — PROGRESS NOTES
Called for low BP post PCI for inferior MI. STAT echo obtained which I reviewed. RV function is preserved. LVEF around 45%. Moderate MR. No pericardial effusion. IVC collapses. Plan for fluid bolus 500cc followed by  100cc/hr. CT showed showed multifocal PNA and bulky LNs. Patient also has septic shock also. WBC 66.6, hgb 7.1 and low PLT with concern of lymphoproliferative disease. Heme/onc has been consult. Titrate pressors to keep MAP > 65.  Continue amiodarone for SVT    Jesus Norman MD

## 2018-01-26 NOTE — CONSULTS
No narrative on file       Family History:   No family history on file.     Review of Systems:    Review of systems not obtained due to patient factors - intubation    Objective:  CURRENT TEMPERATURE:  Temp: 98.6 °F (37 °C)  MAXIMUM TEMPERATURE OVER 24HRS:  Temp (24hrs), Av.1 °F (37.3 °C), Min:98.6 °F (37 °C), Max:99.7 °F (37.6 °C)    CURRENT RESPIRATORY RATE:  Resp: 26  CURRENT PULSE:  Pulse: 76  CURRENT BLOOD PRESSURE:  BP: 108/66  24HR BLOOD PRESSURE RANGE:  Systolic (90DVZ), YTT:649 , Min:76 , ZGC:240   ; Diastolic (18EJJ), THM:80, Min:53, Max:78    24HR INTAKE/OUTPUT:    Intake/Output Summary (Last 24 hours) at 18 1020  Last data filed at 18 0755   Gross per 24 hour   Intake              310 ml   Output              190 ml   Net              120 ml     Patient Vitals for the past 96 hrs (Last 3 readings):   Weight   18 0416 150 lb 2.1 oz (68.1 kg)   18 2329 170 lb (77.1 kg)         Physical Exam:  General appearance: Intubated and mechanically ventilated  Skin: warm and dry, no rash or erythema  Eyes: conjunctivae normal and sclera anicteric  ENT:no thrush no pharyngeal congestion   Neck: NO jvd   Pulmonary: bilateral crackles  Cardiovascular: tachycardiac , normal S1 and S2, no gallops, intact distal pulses and no carotid bruits  Abdomen: soft nontender, bowel sounds present, no organomegaly,  no ascites  Extremities: no cyanosis, clubbing or edema    Labs:  CBC:   Recent Labs      18   WBC  52.0*  66.6*   HGB  6.4*  7.1*   PLT  See Reflexed IPF Result  67*     BMP:  Recent Labs      18   2345  18   NA   --   144  144   K   --   4.7  4.8   CL   --   109*  108*   CO2   --   19*  20   BUN   --   41*  41*   CREATININE  2.20*  1.33*  1.24*   GLUCOSE   --   103*  114*       CBC:  Recent Labs      18   WBC  52.0*  66.6*   RBC  2.11*  2.30*   HGB  6.4*  7.1*   HCT  22.5*  24.6*   MCV  106.6* 107.0*   MCH  30.3  30.9   MCHC  28.4  28.9   RDW  19.6*  19.9*   PLT  See Reflexed IPF Result  67*   MPV  NOT REPORTED  11.7      BMP:   Recent Labs      01/25/18   2345  01/26/18   0233  01/26/18   0348   NA   --   144  144   K   --   4.7  4.8   CL   --   109*  108*   CO2   --   19*  20   BUN   --   41*  41*   CREATININE  2.20*  1.33*  1.24*   GLUCOSE   --   103*  114*   CALCIUM   --   7.0*  7.1*         Albumin:   Recent Labs      01/26/18   0233   LABALBU  2.4*           Urine Protein:    Lab Results   Component Value Date    PROTEINU 1+ 01/26/2018        Radiology:        Assessment:  1. STEVEN secondary to ischemic ATN  to hypotension and cardiogenic/septic shock  No baseline to compare , adm creatinine was 1.2  2. Acute coronary syndrome, multivessel coronary artery disease. Status post PCI of RCA. Possibly planned for PCI of LAD and left circumflex later. 3.  Third degree heart block, resolved after stenting of right coronary artery   4. A. fib/flutter on heparin drip and amiodarone  5/ respiratory failure on mechanical ventilation  6. Possible bilateral pneumonia/pul edema   7. Leucocytosis, wbc- 66k        Plan:  1. Start bicarb drip at 150 ML's an hour, after fluid bolus  2. Continue pressors to keep mean arterial pressure above 65 and wean as tolerated  3. Monitor intake and output  4. Monitor BMP and electrolytes. 5. Diagonostics:  FeNa  US kidneys   UPC ratio        Thank you for the consultation. Please do not hesitate to call with questions. Electronically signed by Niels Boas, MD on 1/26/2018 at 10:20 AM     Attending Physician Statement  I have discussed the care of Sang Dobbs, including pertinent history and exam findings with the resident/fellow. I have reviewed the key elements of all parts of the encounter with the resident/fellow. I have seen and examined the patient with the resident/fellow.   I agree with the assessment and plan and status of the problem list as

## 2018-01-26 NOTE — PROGRESS NOTES
While getting handoff from previous shift, pt HR dropped into the 60's and SBP dropped to 60's. 0750- Levophed max to 30mcg/min. Dr. Tyson Cabello at bedside. 2580- gave atropine 0.5mg   Dr. Ritika Thrasher at bedside  0800- dopamine drip started  0801- gave atropine 0.5mg  0802- dopamine max at 20  0803- gave atropine 0.5mg  0840- pt transferred to cath lab for coronary stent placement.

## 2018-01-26 NOTE — CONSULTS
Today's Date: 1/26/2018  Patient Name: Laurita Drew  Date of admission: 1/25/2018 11:23 PM  Patient's age: 75 y. o., 1950  Admission Dx: Respiratory failure ([address] [J96.90]    Reason for Consult: History of CLL, elevated wbc; lympadenopathy  Requesting Physician: Bradly Rachel MD    CHIEF COMPLAINT:  Respiratory distress    History Obtained From:  Pt chart  HISTORY OF PRESENT ILLNESS:      This is a 76 YO male with history of CLL being treated at [facility] oncology and recently received chemo was admitted with respiratory distress. Pt was intubated and noted to have SVT, received adenosine and Cardiverted. Required intubated. EKG rvealing multivessel disease. He is on multiple pressors for hypotensive shock. He had CT chest abd which showed Lymphadenopathy and also noted to have very High WBC 52K. We do not details records but he has CLL and is followed by oncologist at [place] [id number]. He recentl;y received chemo. Past Medical History:   has no past medical history on file. Past Surgical History:   has no past surgical history on file.      Medications:    Prior to Admission medications    Not on File     Current Facility-Administered Medications   Medication Dose Route Frequency Provider Last Rate Last Dose    amiodarone (CORDARONE) 450 mg in dextrose 5 % 250 mL infusion  1 mg/min Intravenous Continuous Janeen Wood MD 33.3 mL/hr at 01/26/18 0840 1 mg/min at 01/26/18 0840    vancomycin (VANCOCIN) 1250 mg in dextrose 5 % 250 mL IVPB  1,250 mg Intravenous Q24H Alejandro Hernandez MD   1,250 mg at 01/26/18 0200    vancomycin (VANCOCIN) intermittent dosing (placeholder)   Other RX Placeholder Alejandro Hernandez MD   Stopped at 01/26/18 0352    sodium chloride flush 0.9 % injection 10 mL  10 mL Intravenous 2 times per day Sangeeta Long MD        sodium chloride flush 0.9 % injection 10 mL  10 mL Intravenous PRN Sangeeta Long MD        acetaminophen (TYLENOL) tablet 650 mg  650 mg Oral Q4H PRN Sangeeta Long MD  magnesium hydroxide (MILK OF MAGNESIA) 400 MG/5ML suspension 30 mL  30 mL Oral Daily PRN Liat Shafer MD        ondansetron [facility]) injection 4 mg  4 mg Intravenous Q6H PRN Liat Shafer MD        0.9 % sodium chloride bolus  250 mL Intravenous Once Liat Shafer MD 20 mL/hr at 01/26/18 0637 250 mL at 01/26/18 0637    midazolam (VERSED) 100 mg in dextrose 5% 100 mL infusion  1 mg/hr Intravenous Continuous Liat Shafer MD   Stopped at 01/26/18 0745    piperacillin-tazobactam (ZOSYN) 3.375 g in dextrose 50 mL IVPB extended infusion (premix)  3.375 g Intravenous Q8H Liat Shafer MD 12.5 mL/hr at 01/26/18 1330 3.375 g at 01/26/18 1330    heparin (porcine) injection 4,000 Units  4,000 Units Intravenous Once Liat Shafer MD        heparin (porcine) injection 2,000 Units  30 Units/kg Intravenous PRN Vanda Dance, MD        heparin (porcine) injection 4,000 Units  4,000 Units Intravenous PRN Vanda Dance, MD        heparin 25,000 units in dextrose 5% 250 mL infusion  12 Units/kg/hr Intravenous Continuous Vanda Dance, MD        sodium chloride flush 0.9 % injection 10 mL  10 mL Intravenous 2 times per day Vanda Dance, MD        sodium chloride flush 0.9 % injection 10 mL  10 mL Intravenous PRN Vanda Dance, MD        acetaminophen (TYLENOL) tablet 650 mg  650 mg Oral Q4H PRN Vanda Dance, MD        atorvastatin (LIPITOR) tablet 80 mg  80 mg Oral Nightly Vanda Dance, MD        [START ON 1/27/2018] aspirin chewable tablet 81 mg  81 mg Oral Daily Vanda Dance, MD        [START ON 1/27/2018] clopidogrel (PLAVIX) tablet 75 mg  75 mg Oral Daily Vanda Dance, MD        vasopressin 20 Units in dextrose 5 % 100 mL infusion  0.02 Units/min Intravenous Continuous Liat Shafer MD 12 mL/hr at 01/26/18 1326 0.04 Units/min at 01/26/18 1326    [START ON 1/27/2018] albuterol (PROVENTIL) nebulizer solution 2.5 mg  2.5 mg Nebulization BID Nancy Valdivia MD        ipratropium-albuterol (Guera Wang) nebulizer solution 1 ampule  1 ampule Inhalation 4x daily Nancy Valdivia MD   1 ampule at 01/26/18 1133    0.9 % sodium chloride infusion   Intravenous Continuous Queenie Marsh  mL/hr at 01/26/18 1124      glucose (GLUTOSE) 40 % oral gel 15 g  15 g Oral PRN Efrain Gaxiola MD        dextrose 50 % solution 12.5 g  12.5 g Intravenous PRN Efrain Gaxiola MD   12.5 g at 01/26/18 1345    glucagon (rDNA) injection 1 mg  1 mg Intramuscular PRN Efrain Gaxiola MD        dextrose 5 % solution  100 mL/hr Intravenous PRN Efrain Gaxiola MD        sodium bicarbonate 150 mEq in dextrose 5 % 1,000 mL infusion   Intravenous Continuous James Beal  mL/hr at 01/26/18 1245      norepinephrine (LEVOPHED) 16 mg in dextrose 5% 250 mL infusion  2 mcg/min Intravenous Continuous James Beal MD 28.1 mL/hr at 01/26/18 1508 30 mcg/min at 01/26/18 1508    DOPamine (INTROPIN) 400 mg in dextrose 5 % 250 mL infusion  20 mcg/kg/min Intravenous Continuous James Beal MD 51.1 mL/hr at 01/26/18 1331 20 mcg/kg/min at 01/26/18 1331    EPINEPHrine (EPINEPHrine HCL) 5 mg in dextrose 5 % 250 mL infusion  1 mcg/min Intravenous Continuous Sahra Falk MD 60 mL/hr at 01/26/18 1501 20 mcg/min at 01/26/18 1501    dextrose 50 % solution 25 g  25 g Intravenous PRN Sahra Falk MD        0.9 % sodium chloride bolus  1,000 mL Intravenous Once Sahra Falk  mL/hr at 01/26/18 1524 1,000 mL at 01/26/18 1524    levofloxacin (LEVAQUIN) 500 MG/100ML infusion 500 mg  500 mg Intravenous Once Efrain Gaxiola MD           Allergies:  Review of patient's allergies indicates no known allergies. Social History:   reports that he has been smoking Cigarettes. He has been smoking about 0.50 packs per day. He has never used smokeless tobacco. He reports that he drinks alcohol. He reports that he does not use drugs. Family History: family history is not on file.   Family history was reviewed and no pertinent family history Diagnosis Date Noted    Respiratory failure ([facility] [J96.90] 01/26/2018       IMPRESSION:   1. Respiratory failure  2. CLL  3. Hypotensive shock  4. CAD  5. Lymphadenopathy    RECOMMENDATIONS:  1. Intubated and and on multiple pressors  2. Prognosis is guarded  3. Leukocytosis due to CLL  4. Will need records from his oncologist office  5. Continue supportive treatment  6.  discussed with team      Discussed with  Nurse. Thank you for asking us to see this patient.     Garry Mann MD  Hematologist/Medical Oncologist  Cell: [phone]

## 2018-01-26 NOTE — H&P
alcohol. DRUGS:  reports that he does not use drugs. OCCUPATION:      Family History:   No family history on file. REVIEW OF SYSTEMS (ROS):  Unable to obtain      Physical Exam:    Vitals: BP (!) 83/54   Pulse 99   Temp 99.7 °F (37.6 °C) (Oral)   Resp 22   Ht 5' 10\" (1.778 m)   Wt 170 lb (77.1 kg)   SpO2 96%   BMI 24.39 kg/m²     Last Body weight:   Wt Readings from Last 3 Encounters:   01/25/18 170 lb (77.1 kg)       Body Mass Index : Body mass index is 24.39 kg/m². PHYSICAL EXAMINATION :    General appearance - ill appearing; intubated and on vent  HEENT: palpable cervical adenopathy bilaterally  Eyes - pupils equal and reactive  Mouth - mucous membranes moist, pharynx normal without lesions  Chest - clear to auscultation anteriorly; no wheezes, rales or rhonchi, symmetric air entry  Heart - tachycardic, regular rhythm, normal S1, S2, no murmurs, rubs, clicks or gallops  Abdomen - firm, distended  : inguinal adenopathy bilaterally  Neurological - does not respond to commands; is not on sedation  Extremities - peripheral pulses normal, no pedal edema, no clubbing or cyanosis        Any additional physical findings:      Laboratory findings:-    CBC:   Recent Labs      01/26/18 0233   WBC  52.0*   HGB  6.4*   PLT  See Reflexed IPF Result     BMP:    Recent Labs      01/25/18 2345  01/26/18 0233   NA   --   144   K   --   4.7   CL   --   109*   CO2   --   19*   BUN   --   41*   CREATININE  2.20*  1.33*   GLUCOSE   --   103*     S. Calcium:  Recent Labs      01/26/18 0233   CALCIUM  7.0*     S. Ionized Calcium:No results for input(s): IONCA in the last 72 hours. S. Magnesium:No results for input(s): MG in the last 72 hours. S. Phosphorus:No results for input(s): PHOS in the last 72 hours. S. Glucose:  Recent Labs      01/25/18   2345   POCGLU  72*     Glycosylated hemoglobin A1C: No results for input(s): LABA1C in the last 72 hours.   INR:   Recent Labs      01/26/18   0233   INR 1.3     Hepatic functions:   Recent Labs      01/26/18   0233   ALKPHOS  50   ALT  9   AST  28   PROT  4.7*   BILITOT  1.10   BILIDIR  0.73*   LABALBU  2.4*     Pancreatic functions:No results for input(s): LACTA, AMYLASE in the last 72 hours. S. Lactic Acid: No results for input(s): LACTA in the last 72 hours. Cardiac enzymes:No results for input(s): CKTOTAL, CKMB, CKMBINDEX, TROPONINI in the last 72 hours. BNP:No results for input(s): BNP in the last 72 hours. Lipid profile: No results for input(s): CHOL, TRIG, HDL, LDLCALC in the last 72 hours. Invalid input(s): LDL  Blood Gases: No results found for: PH, PCO2, PO2, HCO3, O2SAT  Thyroid functions: No results found for: TSH     Urinalysis:     Microbiology:    Cultures during this admission:     Blood cultures:                 [] None drawn      [] Negative             []  Positive (Details:  )  Urine Culture:                   [] None drawn      [] Negative             []  Positive (Details:  )  Sputum Culture:               [] None drawn       [] Negative             []  Positive (Details:  )   Endotracheal aspirate:     [] None drawn       [] Negative             []  Positive (Details:  )         -----------------------------------------------------------------  Radiological reports:    (See actual reports for details)      Recent Cardiac Catheterization summary:   (See actual reports for details)    Electrocardiogram:     Last Echocardiogram findings:   (See actual reports for details)       Assessment and Plan       Active Problems:    Respiratory failure ([facility]          Plan:    1. Respiratory failure - currently intubated and on vent. FiO2 of 60%, PEEP of 10, tidal volume of 570, respiratory rate of 22. Continue with daily ABGs. Attempts to wean as tolerated. 2. Bilateral pneumonias - continue with the vancomycin and Zosyn. Follow up with daily chest x-rays. Follow-up with sputum culture. 3. Leukocytosis - differential is currently pending. elevation this morning in inferior leads and patient was taken to cath lab emergently around 7:30 to 8:00 and I saw the patient after he came back from cath lab and at that time patient was on 2 pressors with Levophed and dopamine and still hypotensive, cardiac catheterization shows multivessel disease proximal 90% LAD circumflex 90-95% and RCA 99% with clot and per cardiology likely the culprit for acute coronary   syndrome with inferior akinesis and he had a PTCA bare metal stent placed in RCA and he was brought to back to medical ICU. He was intubated overnight because of acute respiratory distress and acute respiratory failure and arterial blood gas this morning after he came back from cath lab shows respiratory and metabolic acidosis, his ventilator setting was reviewed and initially his respiratory rate was 16 which was increased to 30 and that he was in 100% FiO2 he was requiring PEEP of 16 as he was severely hypoxic and Tylenol use was 550 his peak airway pressure was 32 at that time. Anion gap metabolic acidosis and lactic acidosis secondary to severe shock. Arterial blood gases initially shows a pH of 7.27 PCO2 42 and PO2 55 later blood gases after he came back from cardiac cath shows a pH of 7.05 PCO2 55 and a PO2 of 60 with a bicarb of 14    Shock most likely cardiac shock/septic shock secondary to pneumonia is also possible to get  Multifocal pneumonia on the chest x-ray he has bilateral infiltrates mostly in the lower lobe, I have reviewed the CT scan of the chest shows mediastinal lymphadenopathy also shows multifocal consolidation in the lingula and both lower lungs and severe consolidation, it could become required pneumonia and/but healthcare associated infection especially with history of leukemia/lymphoma likely metacarpal once patient and possible chemotherapy. Acute renal failure. Multisystem organ failure due to severe shock.   He has lymphadenopathy present severe in the neck on the CT

## 2018-01-26 NOTE — PROGRESS NOTES
ABH shows PH 7.05, PCO2 54.7, PO2 60.3, HCO3 15      Went up on RR to 30/min     Discussed with nephrology okay to start Bicarb drip as discussed with nephrology. 15 meq at 100 ml/hr will f/u ABG in 2 hrs.

## 2018-01-26 NOTE — PROGRESS NOTES
1600 - pt becomes hypotensive MAP20's   1605 - Dr. Fitzgerald Mt and Dr. Yvonne Caldera at bedside  1606- 2 amps of bicarb   1607 - atropine 0.5mcg given  1608 - atropine 0.5mcg given    Pt.  Codes - see code blue flow sheet in chart

## 2018-01-26 NOTE — PROGRESS NOTES
Was notified that patient had become bradycardic and hypotensive. At this time, patient was on levophed. Patient was in the process of receiving atropine and dopamine. Vitals started to improve. EKG was obtained showing ST elevation in inferior leads and depression in V1 and V2 concerning for posterior wall infarction. Cardiology was consulted and the fellow showed up. There was concern for STEMI. As such, plan was made to take him to cath lab immediately. Candance Darner, EM-2  8:28 AM    Attending Physician Statement  I have discussed the care of Nafisa Troncoso, including pertinent history and exam findings with the resident. I have reviewed the key elements of all parts of the encounter with the resident. I agree with the assessment and plan and status of the problem list as documented.       Griffin Ann MD  1/26/2018 5:23 PM

## 2018-01-26 NOTE — ED PROVIDER NOTES
FACULTY SIGN-OUT  ADDENDUM     Care of Aman Candelaria was assumed from Adán Olmos MD and is being seen for Shortness of Breath (Arrived via EMS on CPAP)  . The patient's initial evaluation and plan have been discussed with the prior provider who initially evaluated the patient. ED COURSE      The patient was given the following medications while in the emergency department:      RECENT VITALS:   Temp: 99.7 °F (37.6 °C),  Pulse: 95, Resp: 25, BP: (!) 83/54    MEDICAL DECISION MAKING       This patient is a 75 y.o. Male. Patient arrived in respiratory distress, tachycardic, and SVT greater than 200, received adenosine, also cardioverted ×2. Patient intubated due to respiratory failure. Chest x-ray showed shows bilateral pneumonias. Central line placed for amiodarone drip. Cardiology consultation. Plan to admit to critical care.       [facility]  Emergency Medicine Attending          Jimena Cheney DO  01/26/18 4963

## 2018-01-26 NOTE — PROGRESS NOTES
Patient's heart rate continues to be volatile. Multiple EKGs were obtained. One showed questionable STEMI. I spoke with the cardiology fellow who stated that this was not a STEMI. Repeat EKG a few minutes later showed tachycardia with a ventricular rate of 149. I spoke with the cardiology fellow again,he believes that this rhythm is consistent with atrial flutter. As such, he recommended administering a dose of digoxin 250 µg now and in four hours, starting heparin drip, obtaining an echocardiogram in the morning and placing a cardiology consult.     Gen Rice, EM-2

## 2018-01-26 NOTE — PROGRESS NOTES
Insert Arterial Line  Date/Time:  01/26/18, 1:45AM  Performed by: Bony Sorensen    Patient identity confirmed: arm band and provided demographic data   Time out: Immediately prior to procedure a \"time out\" was called to verify the correct patient, procedure, equipment, support staff. Preparation: Patient was prepped and draped in the usual sterile fashion.     Location:left radial    Shad's test normal: yes  Needle gauge: 20     Number of attempts: 1  Post-procedure: transparent dressing applied and line secured    Patient tolerance: well

## 2018-01-27 LAB
ABO/RH: NORMAL
ANTIBODY SCREEN: NEGATIVE
ARM BAND NUMBER: NORMAL
BLD PROD TYP BPU: NORMAL
CROSSMATCH RESULT: NORMAL
CULTURE: ABNORMAL
DISPENSE STATUS BLOOD BANK: NORMAL
EXPIRATION DATE: NORMAL
Lab: ABNORMAL
ORGANISM: ABNORMAL
SPECIMEN DESCRIPTION: ABNORMAL
STATUS: ABNORMAL
TRANSFUSION STATUS: NORMAL
UNIT DIVISION: 0
UNIT NUMBER: NORMAL

## 2018-01-27 NOTE — FLOWSHEET NOTE
Arrived via EMS in respiratory distress. Pt was conscious but in distress. Per record Pt had to be intubated due to respiratory failure. Attempts were made to call emergency contact on face sheet which is Demian Pepe ([phone]) his sister, but phone number was disconnected.  Will forward to 1Shift to follow up for possible contacts      01/26/18 0409   Encounter Summary   Services provided to: Patient   Referral/Consult From: Multi-disciplinary team   Continue Visiting (1/26/18)   Complexity of Encounter High   Length of Encounter 1 hour   Routine   Type Initial   Assessment Unable to respond   Intervention Sustaining presence/ Ministry of presence   Outcome Did not respond
Other  referred to day shift  to try to locate family members of patient.  found names of other family through our [employer] system and called the following numbers:  [phone]   All were disconnected or invalid  Called patient's phone number and left message in case a family member retrieves the message. [address] in [place] and they gave contact name/number of \"friend\" Manuel Kowalski [phone] (number not working) or [phone] - which is \"invalid\".  will call [employer] and ask them to go to the house and see if they have information or if they have any information on patient in their system. Did call [employer] record department - they have a different address and more recent address than we do but they cannot give the info out. Record Dept no longer does notifications.  from last night said he gave patient's information to our \"security\" who was going to try to locate patient's next of kin.  called security [phone] and they transferred me to [facility]. I left a message for Lana to call nurse of [facility] or to contact chaplains. I gave Lana the patient's name, date of birth and room number here.
Update on family contacts:  · A family member of patient called MICU by name of Name of Anita Espino who is a granddaughter of patient. · Per [employer] her son Nicky Collins lives with patient and was present when EMS picked patient up last night. · Katia's # is [phone]  · Narcisa Jane is calling her son Rhiannon Ng to tell him to call or come to [facility]'s MICU. (she does not know his phone number off hand and has to look for it in order to call him - so she could not give the # to me.)  · Dr. Laura Maldonado talked to N[facility] to convey to her the urgency of having family that needs to be here to be notified to arrive. Chaplains will follow up this afternoon on family's arrival and or to place another call to Narcisa [facility] to obtain El's phone number. Updated bedside nurse that next shift  will follow up to find out about Rhiannon Ng and/or to call [employer] back to obtain El's phone number.
joined in with them. Pt's aunt said that pt looks like he has peace.  listened as aunt thanked the medical team for doing everything and said, Didi Wharton God is calling you and says it's your time to go, it doesn't matter how hard we try, there is nothing we can do. It's time to go. \" Irma Drew affirmed pt's aunt. When family was ready to leave, family hugged the  and thanked her for the support.  thanked the family and offered her condolences. Family received prayer blanket and a grief packet. Copy of death note is in the log, death note and release of body are in the blue folder, and sympathy card is on the table. DOCTOR SIGNING DEATH NOTE:  Dr. Genesis Celestin SPOKE WITH Sister, John Uribe CONTACTING [address]? Yes       HOME:  Name: [address] View: [place]  Phone Number: [phone]    NEXT OF KIN:  Name: Maryana Martines  Relationship: Sister  Street Address: [facility]  Zip code: [address]   Phone Number: [phone]    [facility]? No    IF SO, WHAT?   None    Electronically signed by Chaplain María, on 2018 at 8:27 PM.

## 2018-01-28 LAB
ACTION: NORMAL
ALLEN TEST: ABNORMAL
ANION GAP: 19 MMOL/L (ref 7–16)
CULTURE: ABNORMAL
DATE AND TIME: NORMAL
DIRECT EXAM: ABNORMAL
FIO2: 100
GFR NON-AFRICAN AMERICAN: 25 ML/MIN
GFR SERPL CREATININE-BSD FRML MDRD: 30 ML/MIN
GFR SERPL CREATININE-BSD FRML MDRD: ABNORMAL ML/MIN/{1.73_M2}
GLUCOSE BLD-MCNC: 60 MG/DL (ref 74–100)
Lab: ABNORMAL
MODE: ABNORMAL
NEGATIVE BASE EXCESS, ART: 22 (ref 0–2)
NOTIFY: NORMAL
O2 DEVICE/FLOW/%: ABNORMAL
ORGANISM: ABNORMAL
ORGANISM: ABNORMAL
PATIENT TEMP: ABNORMAL
POC CHLORIDE: 116 MMOL/L (ref 98–107)
POC CREATININE: 2.59 MG/DL (ref 0.51–1.19)
POC HCO3: 11.2 MMOL/L (ref 21–28)
POC HEMATOCRIT: 19 % (ref 41–53)
POC HEMOGLOBIN: 6.6 G/DL (ref 13.5–17.5)
POC IONIZED CALCIUM: 1.16 MMOL/L (ref 1.15–1.33)
POC LACTIC ACID: 10.56 MMOL/L (ref 0.56–1.39)
POC O2 SATURATION: 41 % (ref 94–98)
POC PCO2 TEMP: ABNORMAL MM HG
POC PCO2: 73.3 MM HG (ref 35–48)
POC PH TEMP: ABNORMAL
POC PH: 6.79 (ref 7.35–7.45)
POC PO2 TEMP: ABNORMAL MM HG
POC PO2: 43.8 MM HG (ref 83–108)
POC POTASSIUM: 4.9 MMOL/L (ref 3.5–4.5)
POC SODIUM: 146 MMOL/L (ref 138–146)
POSITIVE BASE EXCESS, ART: ABNORMAL (ref 0–3)
READ BACK: YES
SAMPLE SITE: ABNORMAL
SPECIMEN DESCRIPTION: ABNORMAL
STATUS: ABNORMAL
TCO2 (CALC), ART: 14 MMOL/L (ref 22–29)

## 2018-01-29 LAB — PATHOLOGIST REVIEW: NORMAL

## 2018-01-29 NOTE — DISCHARGE SUMMARY
[address] [place] [address]     Department of Internal Medicine - Critical Care Service    INPATIENT DEATH SUMMARY      PATIENT IDENTIFICATION:  NAME:  Laurita Drew   :     MRN:    [id number]     Acct:    [de-identified]   Admit Date:  2018  Discharge date:  2018  5:28 PM   Attending Provider: No att. providers found                                     Active Problems:    Acute respiratory failure with hypoxia and hypercapnia (HCC)    Multisystem disorder    Sepsis ([facility]    Septic shock ([facility]    Pneumonia of lower lobe due to infectious organism ([address]    Acute respiratory distress syndrome (ARDS) (HCC)    Chronic obstructive pulmonary disease (HCC)    Leukocytosis    Coronary artery disease involving native coronary artery of native heart    ACS (acute coronary syndrome) ([facility]    ST elevation myocardial infarction involving right coronary artery (HCC)    STEVEN (acute kidney injury) ([facility]    Hyperkalemia    Hypoglycemia    CLL (chronic lymphocytic leukemia) ([facility]       REASON FOR HOSPITALIZATION:   Chief Complaint   Patient presents with    Shortness of Breath     Arrived via EMS on [address] [place] [address] Course  76 yo M patient was admitted to the ICU due to respiratory failure and SVT. patient was cardioverted twice in the ER and intubated due to respiratory distress. Patient had significant pneumonia on CXR, WBC count of 52 and elevated lactic acid. In the ICU patient developed EKG changes and was found to have a STEMI. He was taken to the cath lab where he had two stents placed. Patient remained hypotensive, receiving levophed. He continued to deteriorate throughout the day and required 3 pressors, maxed. Patient then went into cardiac arrest several time and TOD was eventually called after the 5th time he coded.      Consults:   cardiology    Procedures:  Cardiac Cath    Any Hospital Acquired Infections: No    PATIENT'S DISCHARGE CONDITION:        Disposition:

## 2018-02-01 LAB
CULTURE: NORMAL
CULTURE: NORMAL
Lab: NORMAL
SPECIMEN DESCRIPTION: NORMAL
STATUS: NORMAL